# Patient Record
Sex: FEMALE | Race: WHITE | Employment: FULL TIME | URBAN - METROPOLITAN AREA
[De-identification: names, ages, dates, MRNs, and addresses within clinical notes are randomized per-mention and may not be internally consistent; named-entity substitution may affect disease eponyms.]

---

## 2017-10-25 ENCOUNTER — HOSPITAL ENCOUNTER (OUTPATIENT)
Dept: LAB | Age: 47
Discharge: HOME | End: 2017-10-25
Payer: COMMERCIAL

## 2017-10-25 LAB
ALBUMIN SERPL-MCNC: 3.9 G/DL (ref 3.4–5)
ALBUMIN/GLOB SERPL: 1.2 {RATIO} (ref 0.8–1.7)
ALP SERPL-CCNC: 46 U/L (ref 45–117)
ALT SERPL-CCNC: 25 U/L (ref 13–56)
ANION GAP SERPL CALC-SCNC: 7 MMOL/L (ref 3–18)
AST SERPL-CCNC: 18 U/L (ref 15–37)
BASOPHILS # BLD: 0 K/UL (ref 0–0.06)
BASOPHILS NFR BLD: 1 % (ref 0–2)
BILIRUB SERPL-MCNC: 0.5 MG/DL (ref 0.2–1)
BUN SERPL-MCNC: 11 MG/DL (ref 7–18)
BUN/CREAT SERPL: 13 (ref 12–20)
CALCIUM SERPL-MCNC: 8.8 MG/DL (ref 8.5–10.1)
CHLORIDE SERPL-SCNC: 103 MMOL/L (ref 100–108)
CHOLEST SERPL-MCNC: 182 MG/DL
CO2 SERPL-SCNC: 28 MMOL/L (ref 21–32)
CREAT SERPL-MCNC: 0.85 MG/DL (ref 0.6–1.3)
DIFFERENTIAL METHOD BLD: ABNORMAL
EOSINOPHIL # BLD: 0.2 K/UL (ref 0–0.4)
EOSINOPHIL NFR BLD: 4 % (ref 0–5)
ERYTHROCYTE [DISTWIDTH] IN BLOOD BY AUTOMATED COUNT: 12.3 % (ref 11.6–14.5)
GLOBULIN SER CALC-MCNC: 3.2 G/DL (ref 2–4)
GLUCOSE SERPL-MCNC: 92 MG/DL (ref 74–99)
HCT VFR BLD AUTO: 40.9 % (ref 35–45)
HDLC SERPL-MCNC: 84 MG/DL (ref 40–60)
HDLC SERPL: 2.2 {RATIO} (ref 0–5)
HGB BLD-MCNC: 13.9 G/DL (ref 12–16)
LDLC SERPL CALC-MCNC: 80.6 MG/DL (ref 0–100)
LIPID PROFILE,FLP: ABNORMAL
LYMPHOCYTES # BLD: 1.7 K/UL (ref 0.9–3.6)
LYMPHOCYTES NFR BLD: 36 % (ref 21–52)
MCH RBC QN AUTO: 34.2 PG (ref 24–34)
MCHC RBC AUTO-ENTMCNC: 34 G/DL (ref 31–37)
MCV RBC AUTO: 100.5 FL (ref 74–97)
MONOCYTES # BLD: 0.6 K/UL (ref 0.05–1.2)
MONOCYTES NFR BLD: 13 % (ref 3–10)
NEUTS SEG # BLD: 2.1 K/UL (ref 1.8–8)
NEUTS SEG NFR BLD: 46 % (ref 40–73)
PLATELET # BLD AUTO: 332 K/UL (ref 135–420)
PMV BLD AUTO: 10.3 FL (ref 9.2–11.8)
POTASSIUM SERPL-SCNC: 4 MMOL/L (ref 3.5–5.5)
PROT SERPL-MCNC: 7.1 G/DL (ref 6.4–8.2)
RBC # BLD AUTO: 4.07 M/UL (ref 4.2–5.3)
SODIUM SERPL-SCNC: 138 MMOL/L (ref 136–145)
TRIGL SERPL-MCNC: 87 MG/DL (ref ?–150)
TSH SERPL DL<=0.05 MIU/L-ACNC: 3.82 UIU/ML (ref 0.36–3.74)
VLDLC SERPL CALC-MCNC: 17.4 MG/DL
WBC # BLD AUTO: 4.6 K/UL (ref 4.6–13.2)

## 2017-10-25 PROCEDURE — 36415 COLL VENOUS BLD VENIPUNCTURE: CPT | Performed by: FAMILY MEDICINE

## 2017-10-25 PROCEDURE — 80061 LIPID PANEL: CPT | Performed by: FAMILY MEDICINE

## 2017-10-25 PROCEDURE — 85025 COMPLETE CBC W/AUTO DIFF WBC: CPT | Performed by: FAMILY MEDICINE

## 2017-10-25 PROCEDURE — 84443 ASSAY THYROID STIM HORMONE: CPT | Performed by: FAMILY MEDICINE

## 2017-10-25 PROCEDURE — 80053 COMPREHEN METABOLIC PANEL: CPT | Performed by: FAMILY MEDICINE

## 2017-11-03 ENCOUNTER — HOSPITAL ENCOUNTER (OUTPATIENT)
Dept: MAMMOGRAPHY | Age: 47
Discharge: HOME | End: 2017-11-03
Attending: FAMILY MEDICINE
Payer: COMMERCIAL

## 2017-11-03 DIAGNOSIS — Z12.31 VISIT FOR SCREENING MAMMOGRAM: ICD-10-CM

## 2017-11-03 PROCEDURE — 77067 SCR MAMMO BI INCL CAD: CPT

## 2017-11-14 ENCOUNTER — DOCUMENTATION ONLY (OUTPATIENT)
Dept: SURGERY | Age: 47
End: 2017-11-14

## 2017-11-22 ENCOUNTER — HOSPITAL ENCOUNTER (OUTPATIENT)
Dept: ULTRASOUND IMAGING | Age: 47
Discharge: HOME | End: 2017-11-22
Payer: COMMERCIAL

## 2017-11-22 ENCOUNTER — HOSPITAL ENCOUNTER (OUTPATIENT)
Dept: MAMMOGRAPHY | Age: 47
Discharge: HOME | End: 2017-11-22
Payer: COMMERCIAL

## 2017-11-22 DIAGNOSIS — N64.89 DISTORTION OF CONTOUR OF BREAST: ICD-10-CM

## 2017-11-22 DIAGNOSIS — R92.8 ABNORMAL MAMMOGRAM: ICD-10-CM

## 2017-11-22 PROCEDURE — 77065 DX MAMMO INCL CAD UNI: CPT

## 2018-11-16 ENCOUNTER — HOSPITAL ENCOUNTER (OUTPATIENT)
Dept: MAMMOGRAPHY | Age: 48
Discharge: HOME | End: 2018-11-16
Attending: FAMILY MEDICINE
Payer: COMMERCIAL

## 2018-11-16 DIAGNOSIS — Z12.31 SCREENING MAMMOGRAM, ENCOUNTER FOR: ICD-10-CM

## 2018-11-16 PROCEDURE — 77063 BREAST TOMOSYNTHESIS BI: CPT

## 2019-01-04 ENCOUNTER — HOSPITAL ENCOUNTER (OUTPATIENT)
Dept: ULTRASOUND IMAGING | Age: 49
Discharge: HOME | End: 2019-01-04
Attending: FAMILY MEDICINE
Payer: COMMERCIAL

## 2019-01-04 ENCOUNTER — HOSPITAL ENCOUNTER (OUTPATIENT)
Dept: MAMMOGRAPHY | Age: 49
Discharge: HOME | End: 2019-01-04
Attending: FAMILY MEDICINE
Payer: COMMERCIAL

## 2019-01-04 DIAGNOSIS — R92.8 ABNORMAL MAMMOGRAM: ICD-10-CM

## 2019-01-04 PROCEDURE — 77062 BREAST TOMOSYNTHESIS BI: CPT

## 2019-01-04 PROCEDURE — 76642 ULTRASOUND BREAST LIMITED: CPT

## 2019-01-24 ENCOUNTER — HOSPITAL ENCOUNTER (OUTPATIENT)
Dept: MAMMOGRAPHY | Age: 49
Discharge: HOME | End: 2019-01-24
Attending: NURSE PRACTITIONER
Payer: COMMERCIAL

## 2019-01-24 DIAGNOSIS — R92.0 ABNORMAL MAMMOGRAM WITH MICROCALCIFICATION: ICD-10-CM

## 2019-01-24 DIAGNOSIS — R92.8 ABNORMAL MAMMOGRAM: ICD-10-CM

## 2019-01-24 PROCEDURE — 74011250636 HC RX REV CODE- 250/636

## 2019-01-24 PROCEDURE — 74011000250 HC RX REV CODE- 250

## 2019-01-24 PROCEDURE — 77061 BREAST TOMOSYNTHESIS UNI: CPT

## 2019-01-24 PROCEDURE — 77065 DX MAMMO INCL CAD UNI: CPT

## 2019-01-24 PROCEDURE — 88305 TISSUE EXAM BY PATHOLOGIST: CPT

## 2019-01-24 PROCEDURE — 19081 BX BREAST 1ST LESION STRTCTC: CPT

## 2019-01-24 RX ORDER — LIDOCAINE HYDROCHLORIDE AND EPINEPHRINE 10; 10 MG/ML; UG/ML
1-20 INJECTION, SOLUTION INFILTRATION; PERINEURAL
Status: COMPLETED | OUTPATIENT
Start: 2019-01-24 | End: 2019-01-24

## 2019-01-24 RX ORDER — LIDOCAINE HYDROCHLORIDE 10 MG/ML
1-5 INJECTION, SOLUTION EPIDURAL; INFILTRATION; INTRACAUDAL; PERINEURAL
Status: COMPLETED | OUTPATIENT
Start: 2019-01-24 | End: 2019-01-24

## 2019-01-24 RX ADMIN — LIDOCAINE HYDROCHLORIDE AND EPINEPHRINE 20 MG: 10; 10 INJECTION, SOLUTION INFILTRATION; PERINEURAL at 10:00

## 2019-01-24 RX ADMIN — LIDOCAINE HYDROCHLORIDE 3 ML: 10 INJECTION, SOLUTION EPIDURAL; INFILTRATION; INTRACAUDAL; PERINEURAL at 09:10

## 2019-01-24 RX ADMIN — LIDOCAINE HYDROCHLORIDE AND EPINEPHRINE 20 MG: 10; 10 INJECTION, SOLUTION INFILTRATION; PERINEURAL at 09:10

## 2019-01-24 RX ADMIN — LIDOCAINE HYDROCHLORIDE 3 ML: 10 INJECTION, SOLUTION EPIDURAL; INFILTRATION; INTRACAUDAL; PERINEURAL at 10:00

## 2019-02-22 ENCOUNTER — OFFICE VISIT (OUTPATIENT)
Dept: SURGERY | Age: 49
End: 2019-02-22

## 2019-02-22 VITALS
DIASTOLIC BLOOD PRESSURE: 86 MMHG | RESPIRATION RATE: 16 BRPM | SYSTOLIC BLOOD PRESSURE: 134 MMHG | HEIGHT: 68 IN | WEIGHT: 140 LBS | TEMPERATURE: 98 F | HEART RATE: 65 BPM | BODY MASS INDEX: 21.22 KG/M2

## 2019-02-22 DIAGNOSIS — R92.8 ABNORMAL MAMMOGRAM: Primary | ICD-10-CM

## 2019-02-22 NOTE — PROGRESS NOTES
Progress Note    Patient: Romana Nasicmento  MRN: [id number]  SSN: [id number]   YOB: 1970  Age: 50 y.o. Sex: female     Chief Complaint   Patient presents with    Advice Only     intraductal papiloma       HPI    Ms. Leanne Ellison he is a 55-year-old woman who presents with a history of breast biopsies x2 by radiology that turned out to be intraductal papillomas in the periphery of her breast.  He is never had nipple discharge. She has no increased risk of breast cancer and no family members with breast, ovarian, or other cancers. She has 3 kids of menarche at 15. I spent over 45 minutes with the family showed them images from the books regarding the look of intraductal papilloma. I have personally reviewed the images of her ultrasound and mammogram.    History reviewed. No pertinent past medical history.   Past Surgical History:   Procedure Laterality Date    HX HEENT      tooth extraction     HX OTHER SURGICAL      strawberry birthmark removed x2 as infant    HX OTHER SURGICAL  1991    lymph node under right arm cleaned out due to cat scratch fever    HX UROLOGICAL  1998    kidney stone blasted    VASCULAR SURGERY PROCEDURE UNLIST  2009    venous closure dominik extremities      Allergies   Allergen Reactions    Percocet [Oxycodone-Acetaminophen] Nausea and Vomiting    Stadol [Butorphanol Tartrate] Palpitations    Tape [Adhesive] Rash     Surgical tape        Social History     Socioeconomic History    Marital status:      Spouse name: Not on file    Number of children: Not on file    Years of education: Not on file    Highest education level: Not on file   Social Needs    Financial resource strain: Not on file    Food insecurity - worry: Not on file    Food insecurity - inability: Not on file   [employer] needs - medical: Not on file   [employer] needs - non-medical: Not on file   Occupational History    Not on file   Tobacco Use    Smoking status: Never Smoker    Smokeless tobacco: Never Used   Substance and Sexual Activity    Alcohol use: Yes     Comment: socially    Drug use: Not on file    Sexual activity: Not on file   Other Topics Concern    Not on file   Social History Narrative    Not on file     Family History   Problem Relation Age of Onset    Cancer Maternal Grandmother          Review of systems:  Patient denies any reflux, emesis, abdominal pain, change in bowel habits, hematochezia, melena, fever, weight loss, fatigue chills, dermatitis, abnormal moles, change in vision, vertigo, epistaxis, dysphagia, hoarseness, chest pain, palpitations, hypertension, edema, cough, shortness of breath, wheezing, hemoptysis, snoring, hematuria, diabetes, thyroid disease, anemia, bruising, history of blood transfusion, dizziness, headache, or fainting. Physical Examination    Well developed well nourished female in no apparent distress  Visit Vitals  /86   Pulse 65   Temp 98 °F (36.7 °C)   Resp 16   Ht 5' 8\" (1.727 m)   Wt 63.5 kg (140 lb)   BMI 21.29 kg/m²      Head: normocephalic, atraumatic  Mouth: Clear, no overt lesions, oral mucosa pink and moist  Neck: supple, no masses, no adenopathy or carotid bruits, trachea midline  Resp: clear to auscultation bilaterally, no wheeze, rhonchi or rales, excursions normal and symmetrical  Cardio: Regular rate and rhythm, no murmurs, clicks, gallops or rubs, no edema or varicosities  Abdomen: soft, nontender, nondistended, normoactive bowel sounds, no hernias, no hepatosplenomegaly,   Back: Deferred  Extremeties: warm, well-perfused, no tenderness or swelling, normal gait/station  Neuro: sensation and strength grossly intact and symmetrical  Psych: alert and oriented to person, place and time  Breast exam bilateral breast biopsy shows 2 healing biopsy sites in the right breast and a small hematoma behind them. No other masses, skin change, nipple discharge or lymphadenopathy felt.   Left breast normal exam    IMPRESSION  Intraductal papilloma of the peripheral type without atypia    PLAN  No orders of the defined types were placed in this encounter.     Follow-up 6 months with mammogram  Jose Miller MD

## 2019-08-15 ENCOUNTER — HOSPITAL ENCOUNTER (OUTPATIENT)
Dept: MAMMOGRAPHY | Age: 49
Discharge: HOME | End: 2019-08-15
Payer: COMMERCIAL

## 2019-08-15 DIAGNOSIS — R92.8 ABNORMAL MAMMOGRAM: ICD-10-CM

## 2019-08-15 PROCEDURE — 77066 DX MAMMO INCL CAD BI: CPT

## 2019-08-30 ENCOUNTER — OFFICE VISIT (OUTPATIENT)
Dept: SURGERY | Age: 49
End: 2019-08-30

## 2019-08-30 VITALS — HEIGHT: 68 IN | WEIGHT: 140 LBS | BODY MASS INDEX: 21.22 KG/M2 | RESPIRATION RATE: 16 BRPM

## 2019-08-30 DIAGNOSIS — R92.8 ABNORMAL MAMMOGRAM: Primary | ICD-10-CM

## 2019-08-30 NOTE — PROGRESS NOTES
Progress Note    Patient: Violetta Ventura  MRN: [id number]  SSN: [id number]   YOB: 1970  Age: 52 y.o. Sex: female     Chief Complaint   Patient presents with   Ocie Shoulders 3       HPI    Ms. Ricardo Lindsey is a 51-year-old woman with a history of 2 right breast intraductal papillomas excised and some other papillomas noted on mammogram and. She is back today with a new ultrasound 6 months after her previous and it is again a BI-RADS 3. She has no breast health complaints and no palpable nodules. Is not had any breast discharge    History reviewed. No pertinent past medical history.   Past Surgical History:   Procedure Laterality Date    HX HEENT      tooth extraction     HX OTHER SURGICAL      strawberry birthmark removed x2 as infant    HX OTHER SURGICAL  1991    lymph node under right arm cleaned out due to cat scratch fever    HX UROLOGICAL  1998    kidney stone blasted    VASCULAR SURGERY PROCEDURE UNLIST  2009    venous closure dominik extremities      Allergies   Allergen Reactions    Percocet [Oxycodone-Acetaminophen] Nausea and Vomiting    Stadol [Butorphanol Tartrate] Palpitations    Tape [Adhesive] Rash     Surgical tape        Social History     Socioeconomic History    Marital status:      Spouse name: Not on file    Number of children: Not on file    Years of education: Not on file    Highest education level: Not on file   Occupational History    Not on file   Social Needs    Financial resource strain: Not on file    Food insecurity:     Worry: Not on file     Inability: Not on file    Transportation needs:     Medical: Not on file     Non-medical: Not on file   Tobacco Use    Smoking status: Never Smoker    Smokeless tobacco: Never Used   Substance and Sexual Activity    Alcohol use: Yes     Comment: socially    Drug use: Not on file    Sexual activity: Not on file   Lifestyle    Physical activity:     Days per week: Not on file     Minutes per session: Not on file    Stress: Not on file   Relationships    Social connections:     Talks on phone: Not on file     Gets together: Not on file     Attends Shinto service: Not on file     Active member of club or organization: Not on file     Attends meetings of clubs or organizations: Not on file     Relationship status: Not on file    Intimate partner violence:     Fear of current or ex partner: Not on file     Emotionally abused: Not on file     Physically abused: Not on file     Forced sexual activity: Not on file   Other Topics Concern    Not on file   Social History Narrative    Not on file     Family History   Problem Relation Age of Onset    Cancer Maternal Grandmother          Review of systems:  Patient denies any reflux, emesis, abdominal pain, change in bowel habits, hematochezia, melena, fever, weight loss, fatigue chills, dermatitis, abnormal moles, change in vision, vertigo, epistaxis, dysphagia, hoarseness, chest pain, palpitations, hypertension, edema, cough, shortness of breath, wheezing, hemoptysis, snoring, hematuria, diabetes, thyroid disease, anemia, bruising, history of blood transfusion, dizziness, headache, or fainting.     Physical Examination    Well developed well nourished female in no apparent distress  Visit Vitals  Resp 16   Ht 5' 8\" (1.727 m)   Wt 63.5 kg (140 lb)   BMI 21.29 kg/m²      Head: normocephalic, atraumatic  Mouth: Clear, no overt lesions, oral mucosa pink and moist  Neck: supple, no masses, no adenopathy or carotid bruits, trachea midline  Resp: clear to auscultation bilaterally, no wheeze, rhonchi or rales, excursions normal and symmetrical  Cardio: Regular rate and rhythm, no murmurs, clicks, gallops or rubs, no edema or varicosities  Abdomen: soft, nontender, nondistended, normoactive bowel sounds, no hernias, no hepatosplenomegaly,   Back: Deferred  Extremeties: warm, well-perfused, no tenderness or swelling, normal gait/station  Neuro: sensation and strength grossly intact and symmetrical  Psych: alert and oriented to person, place and time  Breast exam bilateral breast exam without dominant mass, skin change, nipple discharge, lymphadenopathy    IMPRESSION  History of intraductal papilloma now stable mammogram    PLAN  No orders of the defined types were placed in this encounter.     Follow-up 6 months with mammogram  Fidel Arellano MD

## 2020-02-17 ENCOUNTER — HOSPITAL ENCOUNTER (OUTPATIENT)
Dept: MAMMOGRAPHY | Age: 50
Discharge: HOME | End: 2020-02-17
Payer: COMMERCIAL

## 2020-02-17 DIAGNOSIS — R92.8 ABNORMAL MAMMOGRAM: ICD-10-CM

## 2020-02-17 PROCEDURE — 77063 BREAST TOMOSYNTHESIS BI: CPT

## 2020-03-11 ENCOUNTER — OFFICE VISIT (OUTPATIENT)
Dept: SURGERY | Age: 50
End: 2020-03-11

## 2020-03-11 VITALS
BODY MASS INDEX: 21.98 KG/M2 | TEMPERATURE: 98.6 F | RESPIRATION RATE: 20 BRPM | HEART RATE: 70 BPM | WEIGHT: 145 LBS | HEIGHT: 68 IN | DIASTOLIC BLOOD PRESSURE: 64 MMHG | SYSTOLIC BLOOD PRESSURE: 136 MMHG

## 2020-03-11 DIAGNOSIS — D36.9 INTRADUCTAL PAPILLOMA: Primary | ICD-10-CM

## 2020-03-11 NOTE — PROGRESS NOTES
Patient presents to establish care. 1. Have you been to the ER, urgent care clinic since your last visit? Hospitalized since your last visit? No    2. Have you seen or consulted any other health care providers outside of the [address] [place] [address] since your last visit? Include any pap smears or colon screening.  No

## 2020-03-11 NOTE — LETTER
3/11/2020 9:29 AM 
 
Patient:  Syndi Bhatti YOB: 1970 Date of Visit: 3/11/2020 241 Greg Nicole MD 
[address] [place], [place] [address] [place] [address] VIA Facsimile: [phone] Dear 241 Greg Nicole MD, 
 
 
I had the pleasure of seeing Ms. Sydni Bhatti in my office today for her intraductal papilloma in transition from Dr. Karrie Reyes. I am including a copy of my office visit today. If you have questions, please do not hesitate to call me. I look forward to following Ms. Coulter along with you and will keep you updated as to her progress. Sincerely, Tammy Lucio MD

## 2020-03-11 NOTE — PATIENT INSTRUCTIONS
If you have any questions or concerns about today's appointment, the verbal and/or written instructions you were given for follow up care, please call our office at [phone].     [facility] - [address]    [phone] office  [phone]

## 2020-03-11 NOTE — PROGRESS NOTES
Ms. Conchita Colbert is a 52year old woman with right breast intraductal papilloma, s/p core biopsy 1/4/19. After meeting with Dr. Willy Castillo, she elected to proceed with conservative management. She denied breast mass. She denied nipple discharge. There is no family history of breast cancer or ovarian cancer. Breast/GYN history:    OB History    No obstetric history on file. No past medical history on file. Past Surgical History:   Procedure Laterality Date    HX HEENT      tooth extraction     HX OTHER SURGICAL      strawberry birthmark removed x2 as infant    HX OTHER SURGICAL  1991    lymph node under right arm cleaned out due to cat scratch fever    HX UROLOGICAL  1998    kidney stone blasted    VASCULAR SURGERY PROCEDURE UNLIST  2009    venous closure dominik extremities        No current outpatient medications on file prior to visit. No current facility-administered medications on file prior to visit.         Allergies   Allergen Reactions    Percocet [Oxycodone-Acetaminophen] Nausea and Vomiting    Stadol [Butorphanol Tartrate] Palpitations    Tape [Adhesive] Rash     Surgical tape        Social History     Tobacco Use    Smoking status: Never Smoker    Smokeless tobacco: Never Used   Substance Use Topics    Alcohol use: Yes     Comment: socially    Drug use: Not on file       Family History   Problem Relation Age of Onset    Cancer Maternal Grandmother          ROS: positives are bolded  General: fevers, chills, night sweats, fatigue, weight loss, weight gain  GI: nausea, vomiting, abdominal pain, change in bowel habits, hematochezia, melena, GERD  Integ: dermatitis, abnormal moles  HEENT: visual changes, vertigo, epistaxis, dysphagia, hoarseness  Cardiac: chest pain, palpitations, HTN, edema, varicosities  Resp: cough, shortness of breath, wheezing, hemoptysis, snoring, reactive airway disease  : hematuria, dysuria, frequency, urgency, nocturia, stress urinary incontinence   MSK: weakness, joint pain, arthritis  Endocrine:  thyroid disease, polyuria, polydipsia, polyphagia, poor wound healing, heat intolerance, cold intolerance  Lymph/Heme: anemia, bruising, history of blood transfusions  Neuro: dizziness, headache, fainting, seizures, stroke  Psych: anxiety, depression    Physical Exam:  Visit Vitals  /64 (BP 1 Location: Right arm, BP Patient Position: Sitting)   Pulse 70   Temp 98.6 °F (37 °C) (Oral)   Resp 20   Ht 5' 8\" (1.727 m)   Wt 65.8 kg (145 lb)   LMP 2019 (Exact Date)   BMI 22.05 kg/m²       Gen:  No distress  Head: normocephalic, atraumatic  Mouth: Clear, no overt lesions, oral mucosa pink and moist  Neck: supple, no masses, no adenopathy, trachea midline  Resp: clear bilaterally  Cardio: Regular rate and rhythm  Abdomen: soft, nontender, nondistended  Extremeties: warm, well-perfused  Neuro: sensation and strength grossly intact and symmetrical  Psych: alert and oriented to person, place and time  Breasts:   Right: Examined in both the supine and upright positions. There was no supraclavicular, infraclavicular, or axillary lympadenopathy. There were no dominant masses, no skin changes, no asymmetry identified healed core biopsy wound, breast smaller than left  Left: Examined in both the supine and upright positions. There was no supraclavicular, infraclavicular, or axillary lympadenopathy. There were no dominant masses, no skin changes, no asymmetry identified       Imagin20 bilateral mammogram (HV)  1. Likely benign findings due to intraductal papilloma. Short term imaging is recommended including: Diagnostic mammogram in 6 months.      BI-RADS Category 3 - Probably Benign     The lack of mammographic evidence of malignancy should not deter further work-up  of a clinically significant palpable finding. Radiodense breast tissue may  obscure an early malignancy or a palpable finding.  10-15% of breast cancers are  not detected by mammography.       Pathology:  1/24/19   A: RIGHT BREAST BIOPSY, 12:00 POSTERIOR:   ADENOSIS, INTRADUCTAL HYPERPLASIA, AND MICROCALCIFICATIONS. B: RIGHT BREAST BIOPSY, 10:00 ANTERIOR:   INTRADUCTAL PAPILLOMA WITH MICROCALCIFICATIONS; ADENOSIS AND INTRADUCTAL HYPERPLASIA. Impression:  Patient Active Problem List   Diagnosis Code    Intraductal papilloma D399      52year old woman with right breast intraductal papilloma, s/p core biopsy 1/4/19. We again reviewed the pathology results of intraductal papilloma in detail. We discussed that they are not malignant. They may cause mass and/or bloody nipple discharge. Traditional recommendation is for excision, though with larger core biopsies, commonly the intraductal papilloma has already been entirely removed. Ms. Holley Jha has already elected to proceed with conservative management and is content with this decision. The imaging was reviewed. Radiology has recommended right mammogram in 6 months (August 2020). She will be due for bilateral mammogram February 2021. Follow up after imaging. She was asked to call sooner with questions or concerns.

## 2020-11-11 ENCOUNTER — HOSPITAL ENCOUNTER (OUTPATIENT)
Dept: LAB | Age: 50
Discharge: HOME | End: 2020-11-11
Payer: COMMERCIAL

## 2020-11-11 LAB
25(OH)D3 SERPL-MCNC: 36.4 NG/ML (ref 30–100)
ALBUMIN SERPL-MCNC: 4.2 G/DL (ref 3.4–5)
ALBUMIN/GLOB SERPL: 1.2 {RATIO} (ref 0.8–1.7)
ALP SERPL-CCNC: 59 U/L (ref 45–117)
ALT SERPL-CCNC: 23 U/L (ref 13–56)
ANION GAP SERPL CALC-SCNC: 2 MMOL/L (ref 3–18)
AST SERPL-CCNC: 18 U/L (ref 10–38)
BASOPHILS # BLD: 0 K/UL (ref 0–0.1)
BASOPHILS NFR BLD: 1 % (ref 0–2)
BILIRUB SERPL-MCNC: 0.5 MG/DL (ref 0.2–1)
BUN SERPL-MCNC: 11 MG/DL (ref 7–18)
BUN/CREAT SERPL: 14 (ref 12–20)
CALCIUM SERPL-MCNC: 9.6 MG/DL (ref 8.5–10.1)
CHLORIDE SERPL-SCNC: 106 MMOL/L (ref 100–111)
CHOLEST SERPL-MCNC: 198 MG/DL
CO2 SERPL-SCNC: 33 MMOL/L (ref 21–32)
CREAT SERPL-MCNC: 0.76 MG/DL (ref 0.6–1.3)
DIFFERENTIAL METHOD BLD: ABNORMAL
EOSINOPHIL # BLD: 0.1 K/UL (ref 0–0.4)
EOSINOPHIL NFR BLD: 2 % (ref 0–5)
ERYTHROCYTE [DISTWIDTH] IN BLOOD BY AUTOMATED COUNT: 11.7 % (ref 11.6–14.5)
GLOBULIN SER CALC-MCNC: 3.4 G/DL (ref 2–4)
GLUCOSE SERPL-MCNC: 98 MG/DL (ref 74–99)
HCT VFR BLD AUTO: 43.4 % (ref 35–45)
HDLC SERPL-MCNC: 96 MG/DL (ref 40–60)
HDLC SERPL: 2.1 {RATIO} (ref 0–5)
HGB BLD-MCNC: 15 G/DL (ref 12–16)
LDLC SERPL CALC-MCNC: 81.6 MG/DL (ref 0–100)
LIPID PROFILE,FLP: ABNORMAL
LYMPHOCYTES # BLD: 1.7 K/UL (ref 0.9–3.6)
LYMPHOCYTES NFR BLD: 40 % (ref 21–52)
MCH RBC QN AUTO: 35.5 PG (ref 24–34)
MCHC RBC AUTO-ENTMCNC: 34.6 G/DL (ref 31–37)
MCV RBC AUTO: 102.8 FL (ref 74–97)
MONOCYTES # BLD: 0.4 K/UL (ref 0.05–1.2)
MONOCYTES NFR BLD: 9 % (ref 3–10)
NEUTS SEG # BLD: 2.1 K/UL (ref 1.8–8)
NEUTS SEG NFR BLD: 48 % (ref 40–73)
PLATELET # BLD AUTO: 281 K/UL (ref 135–420)
PMV BLD AUTO: 10.5 FL (ref 9.2–11.8)
POTASSIUM SERPL-SCNC: 4.3 MMOL/L (ref 3.5–5.5)
PROT SERPL-MCNC: 7.6 G/DL (ref 6.4–8.2)
RBC # BLD AUTO: 4.22 M/UL (ref 4.2–5.3)
SODIUM SERPL-SCNC: 141 MMOL/L (ref 136–145)
TRIGL SERPL-MCNC: 102 MG/DL (ref ?–150)
VLDLC SERPL CALC-MCNC: 20.4 MG/DL
WBC # BLD AUTO: 4.3 K/UL (ref 4.6–13.2)

## 2020-11-11 PROCEDURE — 85025 COMPLETE CBC W/AUTO DIFF WBC: CPT

## 2020-11-11 PROCEDURE — 80053 COMPREHEN METABOLIC PANEL: CPT

## 2020-11-11 PROCEDURE — 82306 VITAMIN D 25 HYDROXY: CPT

## 2020-11-11 PROCEDURE — 80061 LIPID PANEL: CPT

## 2020-11-11 PROCEDURE — 36415 COLL VENOUS BLD VENIPUNCTURE: CPT

## 2021-02-03 ENCOUNTER — TELEPHONE (OUTPATIENT)
Dept: SURGERY | Age: 51
End: 2021-02-03

## 2021-02-03 DIAGNOSIS — D36.9 INTRADUCTAL PAPILLOMA: ICD-10-CM

## 2021-02-03 DIAGNOSIS — R92.8 ABNORMAL MAMMOGRAM: Primary | ICD-10-CM

## 2021-02-03 DIAGNOSIS — R92.8 ABNORMAL MAMMOGRAM: ICD-10-CM

## 2021-02-25 ENCOUNTER — HOSPITAL ENCOUNTER (OUTPATIENT)
Dept: MAMMOGRAPHY | Age: 51
Discharge: HOME | End: 2021-02-25
Attending: SURGERY
Payer: COMMERCIAL

## 2021-02-25 ENCOUNTER — HOSPITAL ENCOUNTER (OUTPATIENT)
Dept: ULTRASOUND IMAGING | Age: 51
Discharge: HOME | End: 2021-02-25
Attending: SURGERY

## 2021-02-25 DIAGNOSIS — Z09 FOLLOW-UP EXAM: ICD-10-CM

## 2021-02-25 DIAGNOSIS — R92.8 ABNORMAL MAMMOGRAM: ICD-10-CM

## 2021-02-25 PROCEDURE — 77062 BREAST TOMOSYNTHESIS BI: CPT

## 2021-02-25 NOTE — PROGRESS NOTES
Ms. Dolores Clemens is a 48year old woman with right breast intraductal papilloma, s/p core biopsy 1/4/19. After meeting with Dr. Tabatha Robertson, she elected to proceed with conservative management. She denied breast mass. She denied nipple discharge. There is no family history of breast cancer or ovarian cancer. Breast/GYN history:    OB History    No obstetric history on file. No past medical history on file. Past Surgical History:   Procedure Laterality Date    HX HEENT      tooth extraction     HX OTHER SURGICAL      strawberry birthmark removed x2 as infant    HX OTHER SURGICAL  1991    lymph node under right arm cleaned out due to cat scratch fever    HX UROLOGICAL  1998    kidney stone blasted    VASCULAR SURGERY PROCEDURE UNLIST  2009    venous closure dominik extremities        No current outpatient medications on file prior to visit. No current facility-administered medications on file prior to visit.         Allergies   Allergen Reactions    Percocet [Oxycodone-Acetaminophen] Nausea and Vomiting    Stadol [Butorphanol Tartrate] Palpitations    Tape [Adhesive] Rash     Surgical tape        Social History     Tobacco Use    Smoking status: Never Smoker    Smokeless tobacco: Never Used   Substance Use Topics    Alcohol use: Yes     Comment: socially    Drug use: Not on file       Family History   Problem Relation Age of Onset    Cancer Maternal Grandmother          ROS: positives are bolded  General: fevers, chills, night sweats, fatigue, weight loss, weight gain  GI: nausea, vomiting, abdominal pain, change in bowel habits, hematochezia, melena, GERD  Integ: dermatitis, abnormal moles  HEENT: visual changes, vertigo, epistaxis, dysphagia, hoarseness  Cardiac: chest pain, palpitations, HTN, edema, varicosities  Resp: cough, shortness of breath, wheezing, hemoptysis, snoring, reactive airway disease  : hematuria, dysuria, frequency, urgency, nocturia, stress urinary incontinence   MSK: weakness, joint pain, arthritis  Endocrine:  thyroid disease, polyuria, polydipsia, polyphagia, poor wound healing, heat intolerance, cold intolerance  Lymph/Heme: anemia, bruising, history of blood transfusions  Neuro: dizziness, headache, fainting, seizures, stroke  Psych: anxiety, depression    Physical Exam:  Visit Vitals  /88   Pulse 80   Temp 97.9 °F (36.6 °C) (Skin)   Resp 20   Ht 5' 8\" (1.727 m)   Wt 70.7 kg (155 lb 12.8 oz)   BMI 23.69 kg/m²       Gen:  No distress  Head: normocephalic, atraumatic  Mouth: Clear, no overt lesions, oral mucosa pink and moist  Neck: supple, no masses, no adenopathy, trachea midline  Resp: clear bilaterally  Cardio: Regular rate and rhythm  Abdomen: soft, nontender, nondistended  Extremeties: warm, well-perfused  Neuro: sensation and strength grossly intact and symmetrical  Psych: alert and oriented to person, place and time  Breasts:   Right: Examined in both the supine and upright positions. There was no supraclavicular, infraclavicular, or axillary lympadenopathy. There were no dominant masses, no skin changes, no asymmetry identified healed core biopsy wound, breast smaller than left  Left: Examined in both the supine and upright positions. There was no supraclavicular, infraclavicular, or axillary lympadenopathy. There were no dominant masses, no skin changes, no asymmetry identified       Imagin21 bilateral mammogram (HV)  No mammographic evidence of malignancy. Routine follow-up advised.        Breast density: C - The breast parenchymal pattern is heterogeneously dense,  which may diminish the sensitivity of mammography.        BIRADS 2:  Benign     20 bilateral mammogram (HV)  1. Likely benign findings due to intraductal papilloma.  Short term imaging is recommended including: Diagnostic mammogram in 6 months.      BI-RADS Category 3 - Probably Benign     The lack of mammographic evidence of malignancy should not deter further work-up  of a clinically significant palpable finding. Radiodense breast tissue may  obscure an early malignancy or a palpable finding. 10-15% of breast cancers are  not detected by mammography.       Pathology:  1/24/19   A: RIGHT BREAST BIOPSY, 12:00 POSTERIOR:   ADENOSIS, INTRADUCTAL HYPERPLASIA, AND MICROCALCIFICATIONS. B: RIGHT BREAST BIOPSY, 10:00 ANTERIOR:   INTRADUCTAL PAPILLOMA WITH MICROCALCIFICATIONS; ADENOSIS AND INTRADUCTAL HYPERPLASIA. Impression:  Patient Active Problem List   Diagnosis Code    Intraductal papilloma D399      52year old woman with right breast intraductal papilloma, s/p core biopsy 1/4/19. We again reviewed the pathology results of intraductal papilloma in detail. We discussed that they are not malignant. They may cause mass and/or bloody nipple discharge. Traditional recommendation is for excision, though with larger core biopsies, commonly the intraductal papilloma has already been entirely removed. Ms. Abigail Ritter has already elected to proceed with conservative management and is content with this decision. The imaging was reviewed and was without change. She will be due for bilateral mammogram February 2022. I have recommended annual 3D mammogram and clinical breast exam.   She was asked to call with questions or concerns.

## 2021-02-26 ENCOUNTER — TELEPHONE (OUTPATIENT)
Dept: SURGERY | Age: 51
End: 2021-02-26

## 2021-03-03 ENCOUNTER — OFFICE VISIT (OUTPATIENT)
Dept: SURGERY | Age: 51
End: 2021-03-03
Payer: COMMERCIAL

## 2021-03-03 VITALS
RESPIRATION RATE: 20 BRPM | WEIGHT: 155.8 LBS | DIASTOLIC BLOOD PRESSURE: 88 MMHG | HEIGHT: 68 IN | TEMPERATURE: 97.9 F | BODY MASS INDEX: 23.61 KG/M2 | HEART RATE: 80 BPM | SYSTOLIC BLOOD PRESSURE: 124 MMHG

## 2021-03-03 DIAGNOSIS — D36.9 INTRADUCTAL PAPILLOMA: Primary | ICD-10-CM

## 2021-03-03 PROCEDURE — 99203 OFFICE O/P NEW LOW 30 MIN: CPT | Performed by: SURGERY

## 2021-03-03 NOTE — PROGRESS NOTES
Patient presents for follow up  right breast intraductal papilloma, s/p core biopsy 1/4/19. 1. Have you been to the ER, urgent care clinic since your last visit? Hospitalized since your last visit? No    2. Have you seen or consulted any other health care providers outside of the [address] [place] [address] since your last visit? Include any pap smears or colon screening.  No

## 2021-03-03 NOTE — LETTER
3/3/2021 8:28 AM 
 
Patient:  Susan Roblero YOB: 1970 Date of Visit: 3/3/2021 Augusto Hay NP 
[address] [place] [address] [address] [address] Via Fax: [phone] Dear Augusto Hay NP, 
 
 
I had the pleasure of seeing Ms. Dexter Hayes in my office today for breast exam.  I am including a copy of my office visit today. If you have questions, please do not hesitate to call me. I look forward to following Ms. Coulter along with you and will keep you updated as to her progress. Sincerely, Amador Aburto MD

## 2021-12-17 ENCOUNTER — HOSPITAL ENCOUNTER (OUTPATIENT)
Dept: GENERAL RADIOLOGY | Age: 51
Discharge: HOME | End: 2021-12-17
Payer: COMMERCIAL

## 2021-12-17 DIAGNOSIS — M25.561 RIGHT MEDIAL KNEE PAIN: ICD-10-CM

## 2021-12-17 PROCEDURE — 73564 X-RAY EXAM KNEE 4 OR MORE: CPT

## 2022-01-27 ENCOUNTER — TRANSCRIBE ORDER (OUTPATIENT)
Dept: SCHEDULING | Age: 52
End: 2022-01-27

## 2022-01-27 DIAGNOSIS — Z12.31 SCREENING MAMMOGRAM, ENCOUNTER FOR: Primary | ICD-10-CM

## 2022-02-08 ENCOUNTER — OFFICE VISIT (OUTPATIENT)
Dept: ORTHOPEDIC SURGERY | Age: 52
End: 2022-02-08
Payer: COMMERCIAL

## 2022-02-08 VITALS
HEART RATE: 72 BPM | HEIGHT: 68 IN | TEMPERATURE: 97.3 F | OXYGEN SATURATION: 98 % | WEIGHT: 162 LBS | BODY MASS INDEX: 24.55 KG/M2

## 2022-02-08 DIAGNOSIS — M22.2X1 PATELLOFEMORAL PAIN SYNDROME OF RIGHT KNEE: Primary | ICD-10-CM

## 2022-02-08 PROCEDURE — 99203 OFFICE O/P NEW LOW 30 MIN: CPT | Performed by: ORTHOPAEDIC SURGERY

## 2022-02-08 RX ORDER — MELOXICAM 7.5 MG/1
7.5 TABLET ORAL AS NEEDED
Qty: 30 TABLET | Refills: 1 | Status: SHIPPED | OUTPATIENT
Start: 2022-02-08 | End: 2022-06-13

## 2022-02-08 RX ORDER — HYDROCHLOROTHIAZIDE 25 MG/1
25 TABLET ORAL DAILY
COMMUNITY
Start: 2021-12-02

## 2022-02-08 RX ORDER — AMLODIPINE BESYLATE 5 MG/1
5 TABLET ORAL DAILY
COMMUNITY
Start: 2022-01-10

## 2022-02-08 NOTE — PROGRESS NOTES
Eric Knowles  1970   Chief Complaint   Patient presents with    Knee Pain     right knee        HISTORY OF PRESENT ILLNESS  Eric Knowles is a 46 y.o. female who presents today for evaluation of right knee. She rates her pain 4/10 today. Pain has been present since summer 2021. She \"cracked\" her knee one night and felt tightness since that incident. She works as a  and has noticed an increase in pain. She [place] [email or web address] that caused an increase of pain. Most of the pain is medial. She notes pain is not present when she's doing activities but the pain increases after. She has been taking aleeve with some relief. She notes tightness throughout the right leg. Patient describes the pain as aching that is Constant in nature. Symptoms are worse with bending, stretching and straightening and is better with  Rest. Associated symptoms include swelling. Since problem started, it: is unchanged. Pain does not wake patient up at night. Has taken aleeve for the problem. Has tried following treatments: Injections:NO; Brace:NO; Therapy:NO; Cane/Crutch:NO       Allergies   Allergen Reactions    Percocet [Oxycodone-Acetaminophen] Nausea and Vomiting    Stadol [Butorphanol Tartrate] Palpitations    Tape [Adhesive] Rash     Surgical tape         History reviewed. No pertinent past medical history.    Social History     Socioeconomic History    Marital status:      Spouse name: Not on file    Number of children: Not on file    Years of education: Not on file    Highest education level: Not on file   Occupational History    Not on file   Tobacco Use    Smoking status: Never Smoker    Smokeless tobacco: Never Used   Substance and Sexual Activity    Alcohol use: Yes     Comment: socially    Drug use: Not on file    Sexual activity: Not on file   Other Topics Concern    Not on file   Social History Narrative    Not on file     Social Determinants of Health     Financial Resource Strain:     Difficulty of Paying Living Expenses: Not on file   Food Insecurity:     Worried About Running Out of Food in the Last Year: Not on file    Mariano of Food in the Last Year: Not on file   Transportation Needs:     Lack of Transportation (Medical): Not on file    Lack of Transportation (Non-Medical): Not on file   Physical Activity:     Days of Exercise per Week: Not on file    Minutes of Exercise per Session: Not on file   Stress:     Feeling of Stress : Not on file   Social Connections:     Frequency of Communication with Friends and Family: Not on file    Frequency of Social Gatherings with Friends and Family: Not on file    Attends [employer] Services: Not on file    Active Member of [address] [place] [address] or Organizations: Not on file    Attends Club or Organization Meetings: Not on file    Marital Status: Not on file   Intimate Partner Violence:     Fear of Current or Ex-Partner: Not on file    Emotionally Abused: Not on file    Physically Abused: Not on file    Sexually Abused: Not on file   Housing Stability:     Unable to Pay for Housing in the Last Year: Not on file    Number of [place] in the Last Year: Not on file    Unstable Housing in the Last Year: Not on file      Past Surgical History:   Procedure Laterality Date    HX HEENT      tooth extraction     HX OTHER SURGICAL      strawberry birthmark removed x2 as infant    HX OTHER SURGICAL  1991    lymph node under right arm cleaned out due to cat scratch fever    HX UROLOGICAL  1998    kidney stone blasted    VASCULAR SURGERY PROCEDURE UNLIST  2009    venous closure dominik extremities       Family History   Problem Relation Age of Onset    Cancer Maternal Grandmother       Current Outpatient Medications   Medication Sig    amLODIPine (NORVASC) 5 mg tablet Take 5 mg by mouth daily.  hydroCHLOROthiazide (HYDRODIURIL) 25 mg tablet Take 25 mg by mouth daily. No current facility-administered medications for this visit. REVIEW OF SYSTEM   Patient denies: Weight loss, Fever/Chills, HA, Visual changes, Fatigue, Chest pain, SOB, Abdominal pain, N/V/D/C, Blood in stool or urine, Edema. Pertinent positive as above in HPI. All others were negative    PHYSICAL EXAM:   Visit Vitals  Pulse 72   Temp 97.3 °F (36.3 °C) (Temporal)   Ht 5' 8\" (1.727 m)   Wt 162 lb (73.5 kg)   SpO2 98%   BMI 24.63 kg/m²     The patient is a well-developed, well-nourished female   in no acute distress. The patient is alert and oriented times three. The patient is alert and oriented times three. Mood and affect are normal.  LYMPHATIC: lymph nodes are not enlarged and are within normal limits  SKIN: normal in color and non tender to palpation. There are no bruises or abrasions noted. NEUROLOGICAL: Motor sensory exam is within normal limits. Reflexes are equal bilaterally. There is normal sensation to pinprick and light touch  MUSCULOSKELETAL:  Examination Right knee   Skin Intact   Range of motion 0-130   Effusion -   Medial joint line tenderness -   Lateral joint line tenderness -   Tenderness Pes Bursa -   Tenderness insertion MCL -   Tenderness insertion LCL -   Bakaris -   Patella crepitus +   Patella grind +   Lachman -   Pivot shift -   Anterior drawer -   Posterior drawer -   Varus stress -   Valgus stress -   Neurovascular Intact   Calf Swelling and Tenderness to Palpation -   Osmani's Test -   Hamstring Cord Tightness +        PROCEDURE: none    IMAGING: XR of the right knee with 2 views obtained in the office dated 12/17/2021 was reviewed and read by Dr. Ruben Jurado:   IMPRESSION  No acute findings. Mild/moderate degenerative changes (osteoarthritis) in the medial compartment and patellofemoral joint     IMPRESSION:      ICD-10-CM ICD-9-CM    1. Patellofemoral pain syndrome of right knee  M22.2X1 719.46         PLAN:  1. Patient presents today with right knee pain due to patellofemoral joint syndrome.  I advised her to use pain as a guide and modify activities. Start with PT to focus on hamstring and IT band. Prescribed Mobic 7.5 to help with inflammation as needed. Return if pain increases. Risk factors include: n/a  2. No ultrasound exam indicated today  3. No cortisone injection indicated today   4. Yes Physical/Occupational Therapy indicated today  5. No diagnostic test indicated today:   6. No durable medical equipment indicated today  7. No referral indicated today   8. Yes medications indicated today: MOBIC 7.5  9. No Narcotic indicated today       RTC prn      Scribed by Gauri Juarez) as dictated by Félix Cuellar MD    I, Dr. Félix Cuellar, confirm that all documentation is accurate.     Félix Cuellar M.D.   [facility]

## 2022-02-25 ENCOUNTER — HOSPITAL ENCOUNTER (OUTPATIENT)
Dept: PHYSICAL THERAPY | Age: 52
Discharge: HOME | End: 2022-02-25
Payer: COMMERCIAL

## 2022-02-25 PROCEDURE — 97161 PT EVAL LOW COMPLEX 20 MIN: CPT

## 2022-02-25 PROCEDURE — 97140 MANUAL THERAPY 1/> REGIONS: CPT

## 2022-02-25 PROCEDURE — 97110 THERAPEUTIC EXERCISES: CPT

## 2022-02-25 NOTE — PROGRESS NOTES
PT DAILY TREATMENT NOTE - [facility]     Patient Name: Eric Knowles  Date:2022  : 1970  [x]  Patient  Verified  Payor: BLUE CROSS / Plan: [facility] PPO / Product Type: PPO /    In time:220  Out time:300  Total Treatment Time (min): 40  Visit #: 1 of 10-12        Medicare/BCBS Only   Total Timed Codes (min):  25 1:1 Treatment Time:  40           Treatment Area: Pain in right knee [M25.561]    SUBJECTIVE  Pain Level (0-10 scale): 3/10  Any medication changes, allergies to medications, adverse drug reactions, diagnosis change, or new procedure performed?: [x] No    [] Yes (see summary sheet for update)  Subjective functional status/changes:   [] No changes reported  See eval    OBJECTIVE    15 min []Eval                  []Re-Eval       15 min Therapeutic Exercise:  [] See flow sheet :   Rationale: increase ROM, increase strength and improve coordination to improve the patients ability to ease wwith adl;s      10 min Manual Therapy:  KT to facilitate Pes   Rationale: decrease pain, increase ROM, increase tissue extensibility and decrease trigger points to ease with adl's    With   [] TE   [] TA   [] neuro   [] other: Patient Education: [x] Review HEP    [] Progressed/Changed HEP based on:   [] positioning   [] body mechanics   [] transfers   [] heat/ice application    [] other:      Other Objective/Functional Measures: see eval     Pain Level (0-10 scale) post treatment: 310    ASSESSMENT/Changes in Function: see poc    Patient will continue to benefit from skilled PT services to modify and progress therapeutic interventions, address functional mobility deficits, address ROM deficits, address strength deficits, analyze and address soft tissue restrictions, analyze and cue movement patterns, analyze and modify body mechanics/ergonomics, assess and modify postural abnormalities and address imbalance/dizziness to attain remaining goals.      []  See Plan of Care  []  See progress note/recertification  []  See Discharge Summary         Progress towards goals / Updated goals:  See poc    PLAN  [x]  Upgrade activities as tolerated     [x]  Continue plan of care  []  Update interventions per flow sheet       []  Discharge due to:_  []  Other:_      Ethyl Castleman, PT 2/25/2022  3:58 PM    Future Appointments   Date Time Provider Yoana Gottlieb   3/4/2022  3:00 PM [facility] EUGEN[facility] RM 3 3D [facility]   3/7/2022  8:15 AM Main Peacock, PTA [facility]   3/9/2022  8:15 AM Main Peacock, PTA [facility]   3/14/2022  9:00 AM Main Peacock, PTA [facility]   3/17/2022  2:15 PM Radha Villarreal, PT [facility]   3/21/2022  8:15 AM Main Peacock, PTA [facility]   3/23/2022 11:15 AM Radha Villarreal, PT [facility]   3/28/2022  9:45 AM Main Peacock, PTA [facility]   3/30/2022  9:00 AM Main Peacock, PTA [facility]

## 2022-02-25 NOTE — PROGRESS NOTES
[facility] [employer] [facility] [employer]  [address] [place] [address]  [phone] fax    Plan of Care/ Statement of Necessity for Physical Therapy Services    Patient name: Clair Silver Start of Care: 2022   Referral source: Osbaldo Osorio,* : 1970    Medical Diagnosis: Pain in right knee [M25.561]  Payor: [facility] / Plan: [facility] PPO / Product Type: PPO /  Onset Date:6 months    Treatment Diagnosis: Right Knee Pain   Prior Hospitalization: see medical history Provider#: [id number]   Medications: Verified on Patient summary List    Comorbidities: HTN   Prior Level of Function: . The Plan of Care and following information is based on the information from the initial evaluation. 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 Her knee would Pop and then become tight. Onset occurred after running a 5k last Thanksgiving 2021. She has since changed shoes for teaching classes and does not run as much. Pt denies any pain at the time of activity, but would have an \"ache\" and sometimes mild swelling after performing Aerobic Instruction. ROM= Full flexion/extension =no pain during or at end range. MMT Quad=5-/5, HS=5-/5, Hip Abd=4+/5, Hip IR/ER=4+/5  Minimal tenderness to right IT band or quad tendon. Varus/Valgus at 0 deg/30 deg=Negative  CARISSA Test positive on right. Pain is described as Achy along medial jt line/ MCL and mild swelling at Pes anserine. Pt will benefit from skilled PT to improve functional mobility, activity tolerance, strengthen VMO/hips/glutes/kness to improve QOL and return to PLOF.      Evaluation Complexity History LOW Complexity : Zero comorbidities / personal factors that will impact the outcome / POC; Examination LOW Complexity : 1-2 Standardized tests and measures addressing body structure, function, activity limitation and / or participation in recreation  ;Presentation LOW Complexity : Stable, uncomplicated  ;Clinical Decision Making MEDIUM Complexity : FOTO score of 26-74  Overall Complexity Rating: LOW   Problem List: pain affecting function, decrease ROM, decrease strength, edema affecting function, impaired gait/ balance, decrease ADL/ functional abilitiies, decrease activity tolerance, decrease flexibility/ joint mobility and decrease transfer abilities   Treatment Plan may include any combination of the following: Therapeutic exercise, Therapeutic activities, Neuromuscular re-education, Physical agent/modality, Gait/balance training, Manual therapy, Patient education, Self Care training and Functional mobility training  Patient / Family readiness to learn indicated by: asking questions, trying to perform skills and interest  Persons(s) to be included in education: patient (P)  Barriers to Learning/Limitations: None  Patient Goal (s): Strength in leg and stretching to prevent further injury.   Patient Self Reported Health Status: excellent  Rehabilitation Potential: good    Short Term Goals: To be accomplished in 1 weeks:   1. Pt will be compliant with a verbal HEP to improve knee sx. (KT to facilitate Pes and Pes Isometrics/SLR with ER)    Long Term Goals: To be accomplished in 6 weeks:   1. Pt will increase FOTO score by 11  pts to improve knee sx. 2. Pt will decrease right knee pain to <2/10 to ease with functional activities. 3. Pt will report >70% improvement in sx to ease with performing activities as a . 4. Pt will increase hip extension and abd to 5/5 to ease with Activity Tolerance. Frequency / Duration: Patient to be seen 2 times per week for 6 weeks.     Patient/ CarPatient/ Caregiver education and instruction: Diagnosis, prognosis, self care, activity modification, brace/ splint application and exercises   [x] Plan of care has been reviewed with DANIELLE Chamberlain, PT 2/25/2022 4:06 PM    ________________________________________________________________________    I certify that the above Therapy Services are being furnished while the patient is under my care. I agree with the treatment plan and certify that this therapy is necessary.     [de-identified] Signature:____________Date:_________TIME:________     Danfreddy English,*  ** Signature, Date and Time must be completed for valid certification **    Please sign and return to [facility] [employer] [employer]  [address] [place] [address]  [phone] fax

## 2022-03-04 ENCOUNTER — HOSPITAL ENCOUNTER (OUTPATIENT)
Dept: MAMMOGRAPHY | Age: 52
Discharge: HOME | End: 2022-03-04
Attending: NURSE PRACTITIONER
Payer: COMMERCIAL

## 2022-03-04 DIAGNOSIS — Z12.31 SCREENING MAMMOGRAM, ENCOUNTER FOR: ICD-10-CM

## 2022-03-04 PROCEDURE — 77063 BREAST TOMOSYNTHESIS BI: CPT

## 2022-03-07 ENCOUNTER — HOSPITAL ENCOUNTER (OUTPATIENT)
Dept: PHYSICAL THERAPY | Age: 52
Discharge: HOME | End: 2022-03-07
Payer: COMMERCIAL

## 2022-03-07 PROCEDURE — 97140 MANUAL THERAPY 1/> REGIONS: CPT

## 2022-03-07 PROCEDURE — 97110 THERAPEUTIC EXERCISES: CPT

## 2022-03-07 NOTE — PROGRESS NOTES
PT DAILY TREATMENT NOTE     Patient Name: Jackie Pi  Date:3/7/2022  : 1970  [x]  Patient  Verified  Payor: BLUE CROSS / Plan: [facility] PPO / Product Type: PPO /    In time: 8:18  Out time:9:06  Total Treatment Time (min): 48  Visit #: 2 of 12    Medicare/BCBS Only   Total Timed Codes (min):  48 1:1 Treatment Time:  48       Treatment Area: Pain in right knee [M25.561]    SUBJECTIVE  Pain Level (0-10 scale): 4-5/10 right SI; 0/10 right knee  Any medication changes, allergies to medications, adverse drug reactions, diagnosis change, or new procedure performed?: [x] No    [] Yes (see summary sheet for update)  Subjective functional status/changes:   [] No changes reported  Pt reports no current knee pain but had an episode of increased right SI pain over the weekend that was worse yesterday and is more tender to touch today. She states she wasn't able to stand after prolonged sitting without increased pain yesterday. Today She can bend and walk and just notices soreness when she pushes it. She said the tape came off pretty quickly so she is unsure if it made a difference. She would like pictures of a few exercises to work on to help remember. 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 Potential 3 mm bone island lateral femoral  condyle. Tiny osteophytes in the patellofemoral joint. Quadriceps enthesopathy. No fracture or effusion. Educated on standing even and not crossing legs in sitting  Right SI joint TTP; educated on ice and piriformis stretching  Added piriformis, hip IR/ER in clams 1,2 strengthening for home  Tightness noted in hamstrings and hip flexors on the right  Initiated all exercises per POC    Pain Level (0-10 scale) post treatment: 2-3/10 right SI    ASSESSMENT/Changes in Function: Initiated exercise program per POC. Pt with pelvic obliquity likely contributing to ongoing right SI and right knee pain. She stands in increased right stance and occasionally crosses her legs with sitting. She demonstrates hip flexor, hamstring, piriformis and TFL tightness with general hip weakness. 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 Patient will continue to benefit from skilled PT services to modify and progress therapeutic interventions, address functional mobility deficits, address ROM deficits, address strength deficits, analyze and address soft tissue restrictions, analyze and cue movement patterns, analyze and modify body mechanics/ergonomics, assess and modify postural abnormalities, address imbalance/dizziness and instruct in home and community integration to attain remaining goals. [x]  See Plan of Care  []  See progress note/recertification  []  See Discharge Summary         Progress towards goals / Updated goals:  Short Term Goals:  To be accomplished in 1 weeks:  1. Pt will be compliant with a verbal HEP to improve knee sx. (KT to facilitate Pes and Pes Isometrics/SLR with ER)  Long Term Goals: To be accomplished in 6 weeks:  1. Pt will increase FOTO score by 11  pts to improve knee sx. 2. Pt will decrease right knee pain to <2/10 to ease with functional activities. 3. Pt will report >70% improvement in sx to ease with performing activities as a . 4. Pt will increase hip extension and abd to 5/5 to ease with Activity Tolerance.     PLAN  [x]  Upgrade activities as tolerated     [x]  Continue plan of care  []  Update interventions per flow sheet       []  Discharge due to:_  []  Other:_      Viral Ramsey, DANIELLE 3/7/2022  7:36 AM    Future Appointments   Date Time Provider Yoana Gottlieb   3/7/2022  8:15 AM Lisa Lent, PTA [facility]   3/9/2022  8:15 AM Lisa Lent, PTA [facility]   3/14/2022  9:00 AM Lisa Lent, PTA [facility]   3/17/2022  2:15 PM Dom Wilcox, PT [facility]   3/21/2022  8:15 AM Lisa Lent, PTA [facility]   3/23/2022 11:15 AM Dom Wilcox, PT [facility]   3/28/2022  9:45 AM Lisa Lent, PTA [facility]   3/30/2022  9:00 AM Lisa Lent, PTA [facility]

## 2022-03-09 ENCOUNTER — HOSPITAL ENCOUNTER (OUTPATIENT)
Dept: PHYSICAL THERAPY | Age: 52
Discharge: HOME | End: 2022-03-09
Payer: COMMERCIAL

## 2022-03-09 PROCEDURE — 97110 THERAPEUTIC EXERCISES: CPT

## 2022-03-09 NOTE — PROGRESS NOTES
PT DAILY TREATMENT NOTE     Patient Name: Fredis Alamo  Date:3/9/2022  : 1970  [x]  Patient  Verified  Payor: BLUE CROSS / Plan: [facility] PPO / Product Type: PPO /    In time: 8:15  Out time: 9:00  Total Treatment Time (min): 45  Visit #: 3 of 12    Medicare/BCBS Only   Total Timed Codes (min):  45 1:1 Treatment Time:  45       Treatment Area: Pain in right knee [M25.561]    SUBJECTIVE  Pain Level (0-10 scale): 0/10  Any medication changes, allergies to medications, adverse drug reactions, diagnosis change, or new procedure performed?: [x] No    [] Yes (see summary sheet for update)  Subjective functional status/changes:   [] No changes reported  Pt report no knee pain but was sore yesterday but she also did a class in the morning. She states still some tenderness (like a bruise) back on her right low back. She incorporated the piriformis stretch and clams at home for strengthening. 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 Potential 3 mm bone island lateral femoral  condyle. Tiny osteophytes in the patellofemoral joint. Quadriceps enthesopathy. No fracture or effusion. Initiated vickie stretch and psoas stretching for hip flexor tightness right>left  (+) vickie bilaterally for psoas not rectus  Initiated LAQ x 3 and hip thrusts for stability  Challenged with hip ER on the left compared to right with TB  Left side plank full: 1:21  Right side plank full: 1:15  B HS tightness noted with SLR    Pain Level (0-10 scale) post treatment: 0/10    ASSESSMENT/Changes in Function: Pt progressing with reduction of upslip with more awareness of sitting posture without crossed legs and more even weight shift in standing. She demonstrates right>left psoas tightness with (+) vickie test bilaterally. She has decreased B HS flexibility noted with increased flexion during SLR. She has decreased hip ER strength and hip abduction strength. She has good side plank endurance. Will continue to progress core and hip stability while addressing screw home mechanism strength for the right knee to reduce pain with prolonged activities like walking, running and instructing fitness classes. Patient will continue to benefit from skilled PT services to modify and progress therapeutic interventions, address functional mobility deficits, address ROM deficits, address strength deficits, analyze and address soft tissue restrictions, analyze and cue movement patterns, analyze and modify body mechanics/ergonomics, assess and modify postural abnormalities, address imbalance/dizziness and instruct in home and community integration to attain remaining goals. [x]  See Plan of Care  []  See progress note/recertification  []  See Discharge Summary         Progress towards goals / Updated goals:  Short Term Goals: To be accomplished in 1 weeks:  1. Pt will be compliant with a verbal HEP to improve knee sx.    (KT to facilitate Pes and Pes Isometrics/SLR with ER)  MET (3/9/22)  Long Term Goals: To be accomplished in 6 weeks:  1. Pt will increase FOTO score by 11  pts to improve knee sx. 2. Pt will decrease right knee pain to <2/10 to ease with functional activities. Progressing 0/10 last two sessions (3/9/22)  3. Pt will report >70% improvement in sx to ease with performing activities as a . 4. Pt will increase hip extension and abd to 5/5 to ease with Activity Tolerance.  Progressing working on super sets with glute exercises (3/9/22)    PLAN  [x]  Upgrade activities as tolerated     [x]  Continue plan of care  []  Update interventions per flow sheet       []  Discharge due to:_  []  Other:_      Karie Galvan PTA 3/9/2022  7:36 AM    Future Appointments   Date Time Provider Yoana Gottlieb   3/9/2022  8:15 AM Texie Slipper, PTA [facility]   3/14/2022  9:00 AM Texie Slipper, PTA [facility]   3/17/2022  2:15 PM Enid Avina, PT [facility]   3/21/2022  8:15 AM Texie Slipper, PTA [facility]   3/23/2022 11:15 AM Enid Avian, PT [facility]   3/28/2022  9:45 AM Texie Slipper, PTA [facility]   3/30/2022  9:00 AM Texie Slipper, PTA [facility]

## 2022-03-14 ENCOUNTER — HOSPITAL ENCOUNTER (OUTPATIENT)
Dept: PHYSICAL THERAPY | Age: 52
Discharge: HOME | End: 2022-03-14
Payer: COMMERCIAL

## 2022-03-14 PROCEDURE — 97140 MANUAL THERAPY 1/> REGIONS: CPT

## 2022-03-14 PROCEDURE — 97110 THERAPEUTIC EXERCISES: CPT

## 2022-03-14 NOTE — PROGRESS NOTES
PT DAILY TREATMENT NOTE     Patient Name: Jackie Pi  Date:3/14/2022  : 1970  [x]  Patient  Verified  Payor: BLUE CROSS / Plan: [facility] PPO / Product Type: PPO /    In time: 9:05   Out time: 9:50  Total Treatment Time (min): 45  Visit #: 4 of 12    Medicare/BCBS Only   Total Timed Codes (min):  45 1:1 Treatment Time:  45       Treatment Area: Pain in right knee [M25.561]    SUBJECTIVE  Pain Level (0-10 scale): 0/10   Any medication changes, allergies to medications, adverse drug reactions, diagnosis change, or new procedure performed?: [x] No    [] Yes (see summary sheet for update)  Subjective functional status/changes:   [] No changes reported  Pt reports an increase in back pain from Friday core class to Saturday that increased her back to 8/10 pain to which she has had to take ibuprofen 800 mg to address. She states pain would be worse in sitting and better with good posture or standing. She did not report symptoms down the leg but has had episodes in the past of left sciatic symptoms with sitting and right sciatic symptoms during pregnancy. She reports less pain today but also took medication this morning. Her right knee hasn't been bothering her but the medication could be masking the symptoms. 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          With   [] TE   [] TA   [] neuro   [] other: Patient Education: [x] Review HEP    [] Progressed/Changed HEP based on:   [] positioning   [] body mechanics   [] transfers   [] heat/ice application    [] other:      Other Objective/Functional Measures:   Educated on directional preference but unable to test given pt not currently having pain symptoms from taking ibuprofen  Suspect disc issue given subjective about sitting versus standing so educated on prone props and prone exercises this visit but instructed pt to be aware to let us know on Thursday appt  Educated about sitting posture with l/s support, sitting on stability ball, or performing desk work standing if able to reduce flexion moment of the spine  No increased pain during session  (+) sciatic nerve glides bilaterally in supine (left felt a nerve pull during foot DF; right felt some numbness in great toe with foot DF) all symptoms reduced with repetitive motions  Left HS tighter compared to right noted during SLR unable to maintain knee extension so performed HS stretch long sitting first    Pain Level (0-10 scale) post treatment: 0/10    ASSESSMENT/Changes in Function: Pt pain reduction of the right knee is skewed due to current intake of ibuprofen for onset of back pain post a class on Friday with increased pain leading into Saturday up to 8/10 pain across the low back. She has reduction of pain with standing, walking and erect posture but increased pain in sitting. She continues to demonstrate a pelvic obliquity and was educated on self correction. Will continue to work on core with hip/knee strengthening in effort to control back and knee pain while working towards established goals.      Patient will continue to benefit from skilled PT services to modify and progress therapeutic interventions, address functional mobility deficits, address ROM deficits, address strength deficits, analyze and address soft tissue restrictions, analyze and cue movement patterns, analyze and modify body mechanics/ergonomics, assess and modify postural abnormalities, address imbalance/dizziness and instruct in home and community integration to attain remaining goals. [x]  See Plan of Care  []  See progress note/recertification  []  See Discharge Summary         Progress towards goals / Updated goals:  Short Term Goals: To be accomplished in 1 weeks:  1. Pt will be compliant with a verbal HEP to improve knee sx. (KT to facilitate Pes and Pes Isometrics/SLR with ER)  MET (3/9/22)  Long Term Goals: To be accomplished in 6 weeks:  1. Pt will increase FOTO score by 11  pts to improve knee sx. 2. Pt will decrease right knee pain to <2/10 to ease with functional activities. Progressing 0/10 last two sessions (3/9/22)  3. Pt will report >70% improvement in sx to ease with performing activities as a . 4. Pt will increase hip extension and abd to 5/5 to ease with Activity Tolerance.  Progressing working on super sets with glute exercises (3/9/22)    PLAN  [x]  Upgrade activities as tolerated     [x]  Continue plan of care  []  Update interventions per flow sheet       []  Discharge due to:_  []  Other:_      Stacia Aase, DANIELLE 3/14/2022  7:36 AM    Future Appointments   Date Time Provider Yoana Gottlieb   3/14/2022  9:00 AM Brady Gipson, PTA [facility]   3/17/2022  2:15 PM Nicolasa Clements, PT [facility]   3/21/2022  8:15 AM Brady Gipson, PTA [facility]   3/23/2022 11:15 AM Nicolasa Clements, PT [facility]   3/28/2022  9:45 AM Brady Gipson, PTA [facility]   3/30/2022  9:00 AM Brady Gipson, PTA [facility]

## 2022-03-17 ENCOUNTER — HOSPITAL ENCOUNTER (OUTPATIENT)
Dept: PHYSICAL THERAPY | Age: 52
Discharge: HOME | End: 2022-03-17
Payer: COMMERCIAL

## 2022-03-17 PROCEDURE — 97112 NEUROMUSCULAR REEDUCATION: CPT

## 2022-03-17 PROCEDURE — 97110 THERAPEUTIC EXERCISES: CPT

## 2022-03-17 PROCEDURE — 97530 THERAPEUTIC ACTIVITIES: CPT

## 2022-03-17 NOTE — PROGRESS NOTES
PT DAILY TREATMENT NOTE - [facility]     Patient Name: Marybeth Campos  Date:3/17/2022  : 1970  [x]  Patient  Verified  Payor: BLUE CROSS / Plan: [facility] PPO / Product Type: PPO /    In time:220  Out time:315  Total Treatment Time (min): 55  Visit #: 5 of     Treatment Area: Pain in right knee [M25.561]    SUBJECTIVE  Pain Level (0-10 scale): 0/10, 3/10 LS  Any medication changes, allergies to medications, adverse drug reactions, diagnosis change, or new procedure performed?: [x] No    [] Yes (see summary sheet for update)  Subjective functional status/changes:   [] No changes reported  Boot camp last night flared up some LS pain. No knee pain but feels it's and underlying issue causing LS sx. Doing her ex's as recommended and feeling a positive difference.      OBJECTIVE    Modality rationale: decrease pain to improve the patients ability to ease with adl's   Min Type Additional Details    [] Estim:  []Unatt       []IFC  []Premod                        []Other:  []w/ice   []w/heat  Position:  Location:    [] Estim: []Att    []TENS instruct  []NMES                    []Other:  []w/US   []w/ice   []w/heat  Position:  Location:    []  Traction: [] Cervical       []Lumbar                       [] Prone          []Supine                       []Intermittent   []Continuous Lbs:  [] before manual  [] after manual    []  Ultrasound: []Continuous   [] Pulsed                           []1MHz   []3MHz W/cm2:  Location:    []  Iontophoresis with dexamethasone         Location: [] Take home patch   [] In clinic   10 []  Ice     [x]  heat  []  Ice massage  []  Laser   []  Anodyne Position:seated  Location:LS    []  Laser with stim  []  Other:  Position:  Location:    []  Vasopneumatic Device Pressure:       [] lo [] med [] hi   Temperature: [] lo [] med [] hi   [] Skin assessment post-treatment:  []intact []redness- no adverse reaction        20 min Therapeutic Exercise:  [] See flow sheet : Rationale: increase ROM, increase strength, improve coordination and improve balance to improve the patients ability to 15    15 min Therapeutic Activity:  []  See flow sheet :   Rationale: improve coordination and improve balance  to improve the patients ability to ease with performing job duties     10 min Neuromuscular Re-education:  []  See flow sheet :core re ed on Oov. Rationale: increase proprioception and core strength  to improve the patients ability to improve core activation          With   [] TE   [] TA   [] neuro   [x] other: Patient Education: [x] Review HEP    [] Progressed/Changed HEP based on:   [] positioning   [] body mechanics   [] transfers   [] heat/ice application    [x] other: Pt ed: get [employer] and change for intermittent desk chair. Wear castelan to teach aerobics classes. Other Objective/Functional Measures: Initial TTP decreased after core activation including activities on the oov.    -Pall of press (Oov)/LE March on Oov (W/O UE's)  SB -clocks. CW/CCW, pelvic tilts, ant/posterior. Left LE shaking during SLR with ER .  _wall lean stretch.   -Wall squat hold with BBK. 2x30 sec  Pain Level (0-10 scale) post treatment: 1-2/10    ASSESSMENT/Changes in Function: Progressing well. Pt reports she has not had to take any ibu full strength since last last visit. She invested in new shoes and recommended to switch shoes for her class. Pt will get a swiss ball for intermittent desk seating while working from home. Patient will continue to benefit from skilled PT services to modify and progress therapeutic interventions, address functional mobility deficits, address ROM deficits, address strength deficits, analyze and address soft tissue restrictions, analyze and cue movement patterns, analyze and modify body mechanics/ergonomics, assess and modify postural abnormalities and address imbalance/dizziness to attain remaining goals.      []  See Plan of Care  []  See progress note/recertification  []  See Discharge Summary         Progress towards goals / Updated goals:      PLAN  []  Upgrade activities as tolerated     []  Continue plan of care  []  Update interventions per flow sheet       []  Discharge due to:_  []  Other:_      Pete To, PT 3/17/2022  2:18 PM    Future Appointments   Date Time Provider Yoana Gottlieb   3/21/2022  8:15 AM Fitz Ibrahim, PTA [facility]   3/23/2022 11:15 AM Catrachita Ritter, PT [id number]   3/28/2022  9:45 AM Fitz Ibrahim, PTA [facility]   3/30/2022  9:00 AM Fitz Ibrahim, PTA [facility]

## 2022-03-19 PROBLEM — D36.9 INTRADUCTAL PAPILLOMA: Status: ACTIVE | Noted: 2020-03-11

## 2022-03-21 ENCOUNTER — HOSPITAL ENCOUNTER (OUTPATIENT)
Dept: PHYSICAL THERAPY | Age: 52
Discharge: HOME | End: 2022-03-21
Payer: COMMERCIAL

## 2022-03-21 PROCEDURE — 97112 NEUROMUSCULAR REEDUCATION: CPT

## 2022-03-21 PROCEDURE — 97530 THERAPEUTIC ACTIVITIES: CPT

## 2022-03-21 PROCEDURE — 97110 THERAPEUTIC EXERCISES: CPT

## 2022-03-21 NOTE — PROGRESS NOTES
PT DAILY TREATMENT NOTE - [facility]     Patient Name: Destiny Myers  Date:3/21/2022  : 1970  [x]  Patient  Verified  Payor: BLUE CROSS / Plan: [facility] PPO / Product Type: PPO /    In time: 8:20  Out time: 9:05  Total Treatment Time (min): 45  Visit #: 6 of     Treatment Area: Pain in right knee [M25.561]    SUBJECTIVE  Pain Level (0-10 scale): 0/10   Any medication changes, allergies to medications, adverse drug reactions, diagnosis change, or new procedure performed?: [x] No    [] Yes (see summary sheet for update)  Subjective functional status/changes:   [] No changes reported  Pt reports no current pain just some minor pull in the right inner thigh over the weekend which she attributes to the new workout last week. She didn't do much additional to her classes from over the weekend in regards to stretching or other exercises. She notes a little residual soreness in the right low back that is calming down.      OBJECTIVE      15 min Therapeutic Exercise:  [x] See flow sheet :   Rationale: increase ROM, increase strength, improve coordination and improve balance to improve the patients ability to 15    10 min Therapeutic Activity:  [x]  See flow sheet : jump holds from plyo box; lunges with abduction   Rationale: improve coordination and improve balance  to improve the patients ability to ease with performing job duties     20 min Neuromuscular Re-education:  [x]  See flow sheet : banded perturbations in SLS, static lunge, captain morgans for glute activation; OOV exercises   Rationale: increase proprioception and core strength  to improve the patients ability to improve core activation          With   [] TE   [] TA   [] neuro   [] other: Patient Education: [x] Review HEP    [] Progressed/Changed HEP based on:   [] positioning   [] body mechanics   [] transfers   [] heat/ice application    [] other:      Other Objective/Functional Measures:  No pelvic obliquity  Standing jumps demonstrates right genu valgus collapse compared to left (focus on sticking landing from 12\" box)  Educated on working on [employer] exercises with glute activation for return to running  Added captain meredith for glute stability  Added TB pulls in various positions at knee to engage glutes (static lunge, SLS, etc)  4+/5 hip abduction and extension on the right compared to 5/5 on the left    Pain Level (0-10 scale) post treatment: 0/10    ASSESSMENT/Changes in Function: Pt demonstrates pain reduction in the knee and back. She still has genu valgus collapse on the right with jumps that would increase right medial knee pain with return to running. She will need to work on progressive right LE loading in standing with glute engagement in order to begin return to running activities to decrease chance of re-irritation of knee pain. Patient will continue to benefit from skilled PT services to modify and progress therapeutic interventions, address functional mobility deficits, address ROM deficits, address strength deficits, analyze and address soft tissue restrictions, analyze and cue movement patterns, analyze and modify body mechanics/ergonomics, assess and modify postural abnormalities and address imbalance/dizziness to attain remaining goals. []  See Plan of Care  []  See progress note/recertification  []  See Discharge Summary         Progress towards goals / Updated goals:  Short Term Goals: To be accomplished in 1 weeks:  1. Pt will be compliant with a verbal HEP to improve knee sx. (KT to facilitate Pes and Pes Isometrics/SLR with ER)  MET (3/9/22)  Long Term Goals: To be accomplished in 6 weeks:  1. Pt will increase FOTO score by 11  pts to improve knee sx. 2. Pt will decrease right knee pain to <2/10 to ease with functional activities. Progressing 0/10 last two sessions (3/9/22)  3. Pt will report >70% improvement in sx to ease with performing activities as a .    4. Pt will increase hip extension and abd to 5/5 to ease with Activity Tolerance.  4+/5 right; 5/5 left      PLAN  [x]  Upgrade activities as tolerated     [x]  Continue plan of care  []  Update interventions per flow sheet       []  Discharge due to:_  []  Other:_      Katia Saucedo PTA 3/21/2022  2:18 PM    Future Appointments   Date Time Provider Yoana Gottlieb   3/21/2022  8:15 AM Fredis Campos PTA [facility]   3/23/2022 11:15 AM Ayan Morillo, PT [id number]   3/28/2022  9:45 AM Fredis Campos PTA [facility]   3/30/2022  9:00 AM Fredis Campos PTA [facility]

## 2022-03-23 ENCOUNTER — HOSPITAL ENCOUNTER (OUTPATIENT)
Dept: PHYSICAL THERAPY | Age: 52
Discharge: HOME | End: 2022-03-23
Payer: COMMERCIAL

## 2022-03-23 PROCEDURE — 97110 THERAPEUTIC EXERCISES: CPT

## 2022-03-23 PROCEDURE — 97112 NEUROMUSCULAR REEDUCATION: CPT

## 2022-03-23 NOTE — PROGRESS NOTES
[facility] [place] [employer]  [address] [place] [address]  [phone] fax        Physical Therapy Progress Note    Patient name: Chris Fierro Start of Care: 2022   Referral source: Natasha Marcus,* : 1970                Medical Diagnosis: Pain in right knee [M25.561]  Payor: [facility] / Plan: [facility] PPO / Product Type: PPO /  Onset Date:6 months                Treatment Diagnosis: Right Knee Pain   Prior Hospitalization: see medical history Provider#: [id number]   Medications: Verified on Patient summary List    Comorbidities: HTN   Prior Level of Function: . Visits from Start of Care: 7    Missed Visits: 0    Established Goals:         Excellent           Good         Limited           None  [x] Increased ROM   []  [x]  []  []  [x] Increased Strength  []  [x]  []  []  [x] Increased Mobility  []  [x]  []  []   [x] Decreased Pain   []  [x]  []  []  [] Decreased Swelling  []  []  []  []    Key Functional Changes: Minimal to no Right Knee pain, improved strength. New shoes with good arch support. Postural Education. Updated Goals: to be achieved in 4 weeks:   1. Pt will increase FOTO score by 11  pts to improve knee sx. Current Status: Progressing FOTO =77, from 70 at eval.      2. Pt will continue to have decrease right knee pain to <1/10 to ease with functional activities. 3. Pt will report >85% improvement in sx to ease with performing activities as a . 4. Pt will increase hip extension and abd to 5/5 to ease with Activity Tolerance.          ASSESSMENT/RECOMMENDATIONS:Pt reports 70-80% improvement to her right knee so far. Right knee pain is non existent currently . Pt does exhibit a Genu Valgus as well as decreased right hip strength that could be attributing to the onset of knee pain.  Pt presents with decreased glute activation, decreased right hip strength and pelvic obliquity that has been assessed and corrected. [x]Continue therapy per initial plan/protocol at a frequency of 1- 2 x per week for 3 weeks  []Continue therapy with the following recommended changes:_____________________      _____________________________________________________________________  []Discontinue therapy progressing towards or have reached established goals  []Discontinue therapy due to lack of appreciable progress towards goals  []Discontinue therapy due to lack of attendance or compliance  []Await Physician's recommendations/decisions regarding therapy  []Other:________________________________________________________________    Thank you for this referral.   Ida Summers, PT 3/23/2022 11:11 AM    NOTE TO PHYSICIAN:  PLEASE COMPLETE THE ORDERS BELOW AND   FAX TO [facility]: [phone]  If you are unable to process this request in 24 hours please contact our office: [phone] I have read the above report and request that my patient continue as recommended. ? I have read the above report and request that my patient continue therapy with the following changes/special instructions:____________________________________  ? I have read the above report and request that my patient be discharged from therapy.     Physicians signature: ______________________________Date: ______Time:______     Reina Dahl,*

## 2022-03-23 NOTE — PROGRESS NOTES
PT DAILY TREATMENT NOTE - [facility]     Patient Name: Kash Weaver  Date:3/23/2022  : 1970  [x]  Patient  Verified  Payor: BLUE CROSS / Plan: [facility] PPO / Product Type: PPO /    In time:1123  Out time:1220  Total Treatment Time (min): 62  Visit #: 7 of     Treatment Area: Pain in right knee [M25.561]    SUBJECTIVE   Pain Level (0-10 scale): 0/10  Any medication changes, allergies to medications, adverse drug reactions, diagnosis change, or new procedure performed?: [x] No    [] Yes (see summary sheet for update)  Subjective functional status/changes:   [] No changes reported  Ptrepots she is 70-80% improved     OBJECTIVE      37 min Therapeutic Exercise:  [] See flow sheet :   Rationale: increase ROM, increase strength, improve coordination and improve balance to improve the patients ability to ease with adl's     20 min Neuromuscular Re-education:  []  See flow sheet :   Rationale: increase ROM, increase strength, improve coordination and improve balance  to improve the patients ability to ease with adl's          With   [] TE   [] TA   [] neuro   [] other: Patient Education: [x] Review HEP    [] Progressed/Changed HEP based on:   [] positioning   [] body mechanics   [] transfers   [] heat/ice application    [] other:      Other Objective/Functional Measures: reports 70-80%   Hip abductions on OOV with Blue Tb was a challenge  Used green Tb to prevent Valgus collapse on right during Eccentric step down. FOTO=77    Pain Level (0-10 scale) post treatment: 0/10    ASSESSMENT/Changes in Function: Progressing steadily. Pt continues to demonstrate right >left hip weakness throughout session.      Patient will continue to benefit from skilled PT services to modify and progress therapeutic interventions, address functional mobility deficits, address ROM deficits, address strength deficits, analyze and address soft tissue restrictions, analyze and cue movement patterns, analyze and modify body mechanics/ergonomics, assess and modify postural abnormalities and address imbalance/dizziness to attain remaining goals. []  See Plan of Care  []  See progress note/recertification  []  See Discharge Summary         Progress towards goals / Updated goals:  Short Term Goals: To be accomplished in 1 weeks:  1. Pt will be compliant with a verbal HEP to improve knee sx. (KT to facilitate [employer]/SLR with ER)  MET (3/9/22)  Long Term Goals: To be accomplished in 6 weeks:  1. Pt will increase FOTO score by 11  pts to improve knee sx. Progressing FOTO =77, from 70 at eval.     2. Pt will decrease right knee pain to <2/10 to ease with functional activities. Progressing 0/10 last two sessions (3/9/22)  3. Pt will report >70% improvement in sx to ease with performing activities as a . MET. 3/23/22  4. Pt will increase hip extension and abd to 5/5 to ease with Activity Tolerance.  4+/5 right; 5/5 left       PLAN  []  Upgrade activities as tolerated     []  Continue plan of care  []  Update interventions per flow sheet       []  Discharge due to:_  []  Other:_      Fabian Martinez, PT 3/23/2022  11:10 AM    Future Appointments   Date Time Provider Yoana Gottlieb   3/23/2022 11:15 AM Romie Smith, PT [facility]   3/28/2022  9:45 AM Mckay Arthur, PTA [facility]   3/30/2022  9:00 AM Mckay Arthur PTA [facility]

## 2022-03-28 ENCOUNTER — HOSPITAL ENCOUNTER (OUTPATIENT)
Dept: PHYSICAL THERAPY | Age: 52
Discharge: HOME | End: 2022-03-28
Payer: COMMERCIAL

## 2022-03-28 PROCEDURE — 97110 THERAPEUTIC EXERCISES: CPT

## 2022-03-28 PROCEDURE — 97112 NEUROMUSCULAR REEDUCATION: CPT

## 2022-03-28 NOTE — PROGRESS NOTES
PT DAILY TREATMENT NOTE - [facility]     Patient Name: Shekhar Kiran  Date:3/28/2022  : 1970  [x]  Patient  Verified  Payor: BLUE CROSS / Plan: [facility] PPO / Product Type: PPO /    In time: 9:52  Out time: 10:31  Total Treatment Time (min): 39  Visit #:  1 of 6    Treatment Area: Pain in right knee [M25.561]    SUBJECTIVE   Pain Level (0-10 scale): 0/10  Any medication changes, allergies to medications, adverse drug reactions, diagnosis change, or new procedure performed?: [x] No    [] Yes (see summary sheet for update)  Subjective functional status/changes:   [] No changes reported  Pt reports no current pain and 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 She states no pain after classes now and being much more aware of not letting her knee collapse. She is wondering when she may be able to try some running.        OBJECTIVE    29 min Therapeutic Exercise:  [x] See flow sheet :   Rationale: increase ROM, increase strength, improve coordination and improve balance to improve the patients ability to ease with adl's     10 min Neuromuscular Re-education:  [x]  See flow sheet : OOV; glute activation exercises   Rationale: increase ROM, increase strength, improve coordination and improve balance  to improve the patients ability to ease with adl's          With   [] TE   [] TA   [] neuro   [] other: Patient Education: [x] Review HEP    [] Progressed/Changed HEP based on:   [] positioning   [] body mechanics   [] transfers   [] heat/ice application    [] other:      Other Objective/Functional Measures:   Continues to have valgus collapse with eccentric step downs  Added TB static lunge with hip abduction and then holding abduction with TB perturbations  Challenged with captain morgans and OOV hip single leg abduction with TB  Added foam roller to back to reduce trunk rotation with bird dogs  No increased pain during session    Pain Level (0-10 scale) post treatment: 0/10    ASSESSMENT/Changes in Function: Pt progressing well incorporating hip strengthening exercises into fitness instruction at the [facility]. She is more aware of reducing genu valgus during exercises and improving time spent stretching to reduce pelvic obliquity and low back pain. She continues to need to work on closed chain strengthening in order to progress back to running activities. Patient will continue to benefit from skilled PT services to modify and progress therapeutic interventions, address functional mobility deficits, address ROM deficits, address strength deficits, analyze and address soft tissue restrictions, analyze and cue movement patterns, analyze and modify body mechanics/ergonomics, assess and modify postural abnormalities and address imbalance/dizziness to attain remaining goals. [x]  See Plan of Care  [x]  See progress note/recertification  []  See Discharge Summary         Updated Goals: to be achieved in 4 weeks:  1. Pt will increase FOTO score by 11  pts to improve knee sx.   Current Status: Progressing FOTO =77, from 70 at eval.   2. Pt will continue to have decrease right knee pain to <1/10 to ease with functional activities. 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     3. Pt will report >85% improvement in sx to ease with performing activities as a .   4. Pt will increase hip extension and abd to 5/5 to ease with Activity Tolerance.      PLAN  [x]  Upgrade activities as tolerated     [x]  Continue plan of care  []  Update interventions per flow sheet       []  Discharge due to:_  []  Other:_      Sunni Austin PTA 3/28/2022  11:10 AM    Future Appointments   Date Time Provider Yoana Gottlieb   3/28/2022  9:45 AM Rozell Osler, PTA [facility]   3/30/2022  9:00 AM Rozell Osler, PTA [facility]

## 2022-03-30 ENCOUNTER — HOSPITAL ENCOUNTER (OUTPATIENT)
Dept: PHYSICAL THERAPY | Age: 52
Discharge: HOME | End: 2022-03-30
Payer: COMMERCIAL

## 2022-03-30 PROCEDURE — 97110 THERAPEUTIC EXERCISES: CPT

## 2022-03-30 NOTE — PROGRESS NOTES
PT DAILY TREATMENT NOTE - [facility]     Patient Name: Renee Alvarado  Date:3/30/2022  : 1970  [x]  Patient  Verified  Payor: BLUE CROSS / Plan: [facility] PPO / Product Type: PPO /    In time: 9:51  Out time: 10:35   Total Treatment Time (min): 44   Visit #: 2 of 6    Treatment Area: Pain in right knee [M25.561]    SUBJECTIVE   Pain Level (0-10 scale): 0/10  Any medication changes, allergies to medications, adverse drug reactions, diagnosis change, or new procedure performed?: [x] No    [] Yes (see summary sheet for update)  Subjective functional status/changes:   [] No changes   Pt reports doing a little running just a lap in between exercises with her class the other day. 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 She has had less pain overall in her knee and back and is happy with her progress thus far. OBJECTIVE    44 min Therapeutic Exercise:  [x] See flow sheet :   Rationale: increase ROM, increase strength, improve coordination and improve balance to improve the patients ability to ease with adl's       With   [] TE   [] TA   [] neuro   [] other: Patient Education: [x] Review HEP    [] Progressed/Changed HEP based on:   [] positioning   [] body mechanics   [] transfers   [] heat/ice application    [] other:      Other Objective/Functional Measures: Added wall ball clams and psoas march to assist with return to running  Added core exercises including mountain climbers and DKTC with SB  No increased pain during session  Challenged with static lunge hip abduction with perturbations  Stretching of all muscles at end of session to reduce DOMS    Pain Level (0-10 scale) post treatment: 0/10    ASSESSMENT/Changes in Function: Pt progressing well towards all established goals. She has overall decreased pain in her knee and back. There is a heavy focus on closed chain glute strengthening to reduce valgus collapse for return to running.  Will continue to incorporate new exercises in preparation for higher level dynamic strengthening for reduced pain post classes and with running. Patient will continue to benefit from skilled PT services to modify and progress therapeutic interventions, address functional mobility deficits, address ROM deficits, address strength deficits, analyze and address soft tissue restrictions, analyze and cue movement patterns, analyze and modify body mechanics/ergonomics, assess and modify postural abnormalities and address imbalance/dizziness to attain remaining goals. [x]  See Plan of Care  [x]  See progress note/recertification  []  See Discharge Summary         Updated Goals: to be achieved in 4 weeks:  1. Pt will increase FOTO score by 11  pts to improve knee sx.   Current Status: Progressing FOTO =77, from 70 at eval.   2. Pt will continue to have decrease right knee pain to <1/10 to ease with functional activities. 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     3. Pt will report >85% improvement in sx to ease with performing activities as a .   4. Pt will increase hip extension and abd to 5/5 to ease with Activity Tolerance.      PLAN  [x]  Upgrade activities as tolerated     [x]  Continue plan of care  []  Update interventions per flow sheet       []  Discharge due to:_  []  Other:_      James Javier PTA 3/30/2022  11:10 AM    Future Appointments   Date Time Provider Yoana Gottlieb   3/30/2022  9:45 AM Christophe Abel, PTA [facility]   4/5/2022  9:00 AM Christophe bAel PTA [facility]   4/13/2022  1:30 PM Christophe Abel PTA [facility]   4/19/2022  3:45 PM Ashu Montez, PT [facility]   4/26/2022  9:00 AM Christophe Abel, DANIELLE [facility]

## 2022-04-05 ENCOUNTER — HOSPITAL ENCOUNTER (OUTPATIENT)
Dept: PHYSICAL THERAPY | Age: 52
Discharge: HOME | End: 2022-04-05
Payer: COMMERCIAL

## 2022-04-05 PROCEDURE — 97110 THERAPEUTIC EXERCISES: CPT

## 2022-04-05 NOTE — PROGRESS NOTES
PT DAILY TREATMENT NOTE - [facility]     Patient Name: Alex Rowell  Date:2022  : 1970  [x]  Patient  Verified  Payor: BLUE CROSS / Plan: [facility] PPO / Product Type: PPO /    In time: 9:05  Out time:  9:45  Total Treatment Time (min):  40  Visit #: 3 of 6    Treatment Area: Pain in right knee [M25.561]    SUBJECTIVE   Pain Level (0-10 scale): 1-2/10  Any medication changes, allergies to medications, adverse drug reactions, diagnosis change, or new procedure performed?: [x] No    [] Yes (see summary sheet for update)  Subjective functional status/changes:   [] No changes   Pt c/o of continued right inner knee pain but different than the pain she felt before (used to be a tweaked feeling but now its just an occasional dull, fatigue feeling). She has been compliant with her HEP and continues to exercise. 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      OBJECTIVE    40 min Therapeutic Exercise:  [x] See flow sheet :   Rationale: increase ROM, increase strength, improve coordination and improve balance to improve the patients ability to ease with adls       With   [] TE   [] TA   [] neuro   [] other: Patient Education: [x] Review HEP    [] Progressed/Changed HEP based on:   [] positioning   [] body mechanics   [] transfers   [] heat/ice application    [] other:      Other Objective/Functional Measures:   No pelvic obliquity found during assessment    Added weighted soleus \"creep\" walks, single leg prisoner calf raises, step ups with a plus, [facility] split squats, and psoas march with elevated bridge to assist with return to running  Added core exercises including side planks with rows (PTB) and flamingo twists    Decreased pain during session  Challenged with bird/dog with foam roll vertical to reduce trunk rotation      Pain Level (0-10 scale) post treatment: 0/10    ASSESSMENT/Changes in Function: Pt progressing well towards all established goals. She has overall decreased pain in her knee and back. Today's session focused on soleus and gastroc strength to return to running. Will continue to incorporate new exercises in preparation for higher level dynamic strengthening for reduced pain post classes and with running. Pt with improved awareness during classes to prevent genu valgus. Patient will continue to benefit from skilled PT services to modify and progress therapeutic interventions, address functional mobility deficits, address ROM deficits, address strength deficits, analyze and address soft tissue restrictions, analyze and cue movement patterns, analyze and modify body mechanics/ergonomics, assess and modify postural abnormalities and address imbalance/dizziness to attain remaining goals. [x]  See Plan of Care  [x]  See progress note/recertification  []  See Discharge Summary         Updated Goals: to be achieved in 4 weeks:  1. Pt will increase FOTO score by 11  pts to improve knee sx. Current Status: Progressing FOTO =77, from 70 at eval.   2. Pt will continue to have decrease right knee pain to <1/10 to ease with functional activities. 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 No pain today (4/5/22)  3. Pt will report >85% improvement in sx to ease with performing activities as a . 4. Pt will increase hip extension and abd to 5/5 to ease with Activity Tolerance. PLAN  [x]  Upgrade activities as tolerated     [x]  Continue plan of care  []  Update interventions per flow sheet       []  Discharge due to:_  [x]  Other:_  Check soreness following last session. QIAN Alarcon 4/5/2022  11:10 AM    I was present during the entire treatment, directing and participating in the treatment.    TAY Venegas     Future Appointments   Date Time Provider Yoana Gottlieb   4/5/2022  9:00 AM [facility]   4/13/2022  1:30 PM [facility]   4/19/2022 3:45 PM Clara Beasley, PT [facility]   4/26/2022  9:00 AM Rozell Osler, PTA [facility]

## 2022-04-13 ENCOUNTER — HOSPITAL ENCOUNTER (OUTPATIENT)
Dept: PHYSICAL THERAPY | Age: 52
Discharge: HOME | End: 2022-04-13
Payer: COMMERCIAL

## 2022-04-13 PROCEDURE — 97140 MANUAL THERAPY 1/> REGIONS: CPT

## 2022-04-13 PROCEDURE — 97110 THERAPEUTIC EXERCISES: CPT

## 2022-04-13 NOTE — PROGRESS NOTES
PT DAILY TREATMENT NOTE - [facility]     Patient Name: Ruslan Cortez  Date:2022  : 1970  [x]  Patient  Verified  Payor: BLUE CROSS / Plan: [facility] PPO / Product Type: PPO /    In time:1:35  Out time: 2:17  Total Treatment Time (min):  42  Visit #: 4 of 6    Treatment Area: Pain in right knee [M25.561]    SUBJECTIVE   Pain Level (0-10 scale): 0/10  Any medication changes, allergies to medications, adverse drug reactions, diagnosis change, or new procedure performed?: [x] No    [] Yes (see summary sheet for update)  Subjective functional status/changes:   [] No changes    Pt presented to therapy with no pain or complaints. She states she drove a total of 9 hours over the weekend to visit her grand baby. She reports a feeling of discomfort in the left hip while sitting on the passenger side of the car. OBJECTIVE    34 min Therapeutic Exercise:  [x] See flow sheet :    Rationale: increase ROM, increase strength, improve coordination and improve balance to improve the patients ability to ease with adls     8 min Manual Therapy:  [x] See flow sheet : MET for right AI, shot gun technique, and MET for a backward rotated sacrum right/left       With   [] TE   [] TA   [] neuro   [] other: Patient Education: [x] Review HEP    [] Progressed/Changed HEP based on:   [] positioning   [] body mechanics   [] transfers   [] heat/ice application    [] other:      Other Objective/Functional Measures:     Pt right knee valgus has significantly improved from 8\" step jump    Pt form is good and required no corrections    Pain Level (0-10 scale) post treatment: 0/10    ASSESSMENT/Changes in Function: Pt progressing well towards all established goals. She has overall decreased pain in her knee. Today's session focused on glute, soleus and gastroc strength to assist with return to running. Will continue to incorporate new exercises to decrease genu valgus for return to running.      Patient will continue to benefit from skilled PT services to modify and progress therapeutic interventions, address functional mobility deficits, address ROM deficits, address strength deficits, analyze and address soft tissue restrictions, analyze and cue movement patterns, analyze and modify body mechanics/ergonomics, assess and modify postural abnormalities and address imbalance/dizziness to attain remaining goals. [x]  See Plan of Care  [x]  See progress note/recertification  []  See Discharge Summary         Updated Goals: to be achieved in 4 weeks:  1. Pt will increase FOTO score by 11  pts to improve knee sx. Current Status: Progressing FOTO =77, from 70 at eval.   2. Pt will continue to have decrease right knee pain to <1/10 to ease with functional activities. 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 No pain today (4/5/22)  3. Pt will report >85% improvement in sx to ease with performing activities as a . 4. Pt will increase hip extension and abd to 5/5 to ease with Activity Tolerance. PLAN  [x]  Upgrade activities as tolerated     [x]  Continue plan of care  []  Update interventions per flow sheet       []  Discharge due to:_  []  Other:_      QIAN Hedrick 4/13/2022  11:10 AM    I was present during the entire treatment, directing and participating in the treatment.    TAY Sandhu     Future Appointments   Date Time Provider Yaona Gottlieb   4/13/2022  1:30 PM Nolberto Villela [facility]   4/19/2022  3:45 PM Alejandrina Odell PT [facility]   4/26/2022  9:00 AM Ghislaine Tompkins PTA [facility]

## 2022-04-19 ENCOUNTER — HOSPITAL ENCOUNTER (OUTPATIENT)
Dept: PHYSICAL THERAPY | Age: 52
Discharge: HOME | End: 2022-04-19
Payer: COMMERCIAL

## 2022-04-19 PROCEDURE — 97530 THERAPEUTIC ACTIVITIES: CPT

## 2022-04-19 PROCEDURE — 97110 THERAPEUTIC EXERCISES: CPT

## 2022-04-19 NOTE — PROGRESS NOTES
[facility] [place] [employer]  [address] [place] [address]  [phone] fax    Physician Update  [x] Progress Note  [x] Discharge Summary  Patient name: Alex Rowell Start of Care: 2022   Referral source: Enoch Hutton,* : 1970   Medical/Treatment Diagnosis: Pain in right knee [M25.561]  Payor: [facility] / Plan: [facility] PPO / Product Type: PPO /  Onset Date:6 months            Prior Hospitalization: see medical history Provider#: [id number]   Medications: Verified on Patient Summary List    Comorbidities: HTN  Prior Level of Function: .    Visits from Start of Care: 13    Missed Visits: 0    Status at Evaluation/Last Progress Note: Pt reports 70-80% improvement to her right knee so far. Right knee pain is non existent currently . Pt does exhibit a Genu Valgus as well as decreased right hip strength that could be attributing to the onset of knee pain. Pt presents with decreased glute activation, decreased right hip strength and pelvic obliquity that has been assessed and corrected. Progress towards Goals:   1. Pt will increase FOTO score by 11  pts to improve knee sx. Met 81/100  2. Pt will continue to have decrease right knee pain to <1/10 to ease with functional activities. Met 0-1/10  3. Pt will report >85% improvement in sx to ease with performing activities as a . Met 90%  4. Pt will increase hip extension and abd to 5/5 to ease with Activity Tolerance. Progressed 4+/5    Goals: to be achieved in 1 visit:  1. Patient will be compliant and independent with a final HEP in order to progress back into running without increased right knee pain. ASSESSMENT/RECOMMENDATIONS: Mrs. Rufina Lanes is making excellent progress towards goals in therapy meeting 3/4 final goals. She subjectively reports 90% improvement since start of care and averages 0-1/10 right knee pain.  She is highly compliant with her exercises and incorporating form corrections into her classes as a . She has been able to complete classes without increased pain and is going to start a slow walk/jog program to return to goal of running. Her FOTO significantly improved to 81/100 meeting her goal. Her hip extension and abduction strength increased to 4+/5. Skilled PT is medically necessary for one final visit to ensure independence with a final home program for continued maintenance and pain control as she progresses back to running and continues job as a . [x]Continue therapy per initial plan/protocol at a frequency of  1 x per week for 1 visit  []Continue therapy with the following recommended changes:_____________________      _____________________________________________________________________  []Discontinue therapy progressing towards or have reached established goals  []Discontinue therapy due to lack of appreciable progress towards goals  []Discontinue therapy due to lack of attendance or compliance  []Await Physician's recommendations/decisions regarding therapy  []Other:________________________________________________________________    Thank you for this referral.   Néstor Albarran, PTA 4/19/2022 4:53 PM  NOTE TO PHYSICIAN:  [address] TO [facility] Physical Therapy: [phone]  If you are unable to process this request in 24 hours please contact our office: [phone] I have read the above report and request that my patient continue as recommended. ? I have read the above report and request that my patient continue therapy with the following changes/special instructions:____________________________________  ? I have read the above report and request that my patient be discharged from therapy.     [de-identified] Signature:____________Date:_________TIME:________     Tiffany Drought,*  ** Signature, Date and Time must be completed for valid certification **

## 2022-04-19 NOTE — PROGRESS NOTES
PT DAILY TREATMENT NOTE - [facility]     Patient Name: Santiago Guzmán  [id number]  : 1970  [x]  Patient  Verified  Payor: BLUE CROSS / Plan: [facility] PPO / Product Type: PPO /    In time: 3:45  Out time:  4:35  Total Treatment Time (min): 50   Visit #: 5 of 6    Treatment Area: Pain in right knee [M25.561]    SUBJECTIVE   Pain Level (0-10 scale): 0/10  Any medication changes, allergies to medications, adverse drug reactions, diagnosis change, or new procedure performed?: [x] No    [] Yes (see summary sheet for update)  Subjective functional status/changes:   [] No changes   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 She hasn't tried to run a mile yet, just 1 lap running in her classes.       OBJECTIVE    40 min Therapeutic Exercise:  [x] See flow sheet :   Rationale: increase ROM, increase strength, improve coordination and improve balance to improve the patients ability to ease with adls    10 min Therapeutic Activity:  [x] See flow sheet :FOTO, objective measure, pt education for self MET   Rationale: increase ROM, increase strength, improve coordination and improve balance to improve the patients ability to ease with adls         With   [] TE   [] TA   [] neuro   [] other: Patient Education: [x] Review HEP    [] Progressed/Changed HEP based on:   [] positioning   [] body mechanics   [] transfers   [] heat/ice application    [] other:      Other Objective/Functional Measures:  FOTO 81/100  0-1/10 on average pain  3-4/10 at most for pain  90% overall improvement   MMT  Hip ABD right 4+/5  Hip Extension 4+/5      Pt displayed improved form for bird dogs and lunge exercises    Pain Level (0-10 scale) post treatment: 0/10    ASSESSMENT/Changes in Function:   See Physician update    Patient will continue to benefit from skilled PT services to modify and progress therapeutic interventions, address functional mobility deficits, address ROM deficits, address strength deficits, analyze and address soft tissue restrictions, analyze and cue movement patterns, analyze and modify body mechanics/ergonomics, assess and modify postural abnormalities and address imbalance/dizziness to attain remaining goals. [x]  See Plan of Care  [x]  See progress note/recertification  []  See Discharge Summary         Updated Goals: to be achieved in 4 weeks:  1. Pt will increase FOTO score by 11  pts to improve knee sx. Met (4/19/22)  Current Status: Progressing FOTO =77, from 70 at eval. 81/100 Met (4/19/22)  2. Pt will continue to have decrease right knee pain to <1/10 to ease with functional activities. 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 No pain today (4/5/22)   3. Pt will report >85% improvement in sx to ease with performing activities as a . 90% Met (4/19/22)  4. Pt will increase hip extension and abd to 5/5 to ease with Activity Tolerance. Progressing 4+/5 (4/19/22)    PLAN  [x]  Upgrade activities as tolerated     [x]  Continue plan of care  []  Update interventions per flow sheet       []  Discharge due to:_  [x]  Other:_   D/C next visit    Hayden Wilde, [address] [place] [address] 4/19/2022  11:10 AM    I was present during the entire treatment, directing and participating in the treatment.    TAY Neil     Future Appointments   Date Time Provider Yoana Gottlieb   4/26/2022  9:00 AM Dmitri Richard PTA [id number]

## 2022-04-26 ENCOUNTER — HOSPITAL ENCOUNTER (OUTPATIENT)
Dept: PHYSICAL THERAPY | Age: 52
Discharge: HOME | End: 2022-04-26
Payer: COMMERCIAL

## 2022-04-26 PROCEDURE — 97110 THERAPEUTIC EXERCISES: CPT

## 2022-04-26 NOTE — PROGRESS NOTES
PT DAILY TREATMENT NOTE - [facility]     Patient Name: Aime Mitchell  Date:2022  : 1970  [x]  Patient  Verified  Payor: BLUE CROSS / Plan: [facility] PPO / Product Type: PPO /    In time:9:05 Out time: 9:45  Total Treatment Time (min):  40  Visit #: 1 of 1    Treatment Area: Pain in right knee [M25.561]    SUBJECTIVE   Pain Level (0-10 scale): 0/10  Any medication changes, allergies to medications, adverse drug reactions, diagnosis change, or new procedure performed?: [x] No    [] Yes (see summary sheet for update)  Subjective functional status/changes:   [] No changes   Pt states she is able to run 2 laps around the gym during her aerobic classes. She stated during visit her ankles keep her from being able to balance better. OBJECTIVE    40 min Therapeutic Exercise:  [x] See flow sheet :    Rationale: increase ROM, increase strength, improve coordination and improve balance to improve the patients ability to ease with adls         With   [] TE   [] TA   [] neuro   [] other: Patient Education: [x] Review HEP    [] Progressed/Changed HEP based on:   [] positioning   [x] body mechanics   [] transfers   [] heat/ice application    [] other:      Other Objective/Functional Measures:     Performed left leg lengthening technique to correct left upslip  Gave updated HEP including but not limited to SL squats to chair, SL squats with opposite ball to wall, and static bridges with marches and able to perform without complications  Added ankle 4 way to promote stability to increase balance tolerance  Provided PTB for HEP    Pain Level (0-10 scale) post treatment: 0/10    ASSESSMENT/Changes in Function:   Pt met her goal.  She consistent with completion of her final HEP. Pt was given new exercises to continue progressing at home. She is ready for D/C secondary to meeting all goals and no pain while performing recreational activities.   Patient will continue to benefit from skilled PT services to    []  See Plan of Care  []  See progress note/recertification  [x]  See Discharge Summary         Updated Goals:  to be achieved in 1 visit:  1. Patient will be compliant and independent with a final HEP in order to progress back into running without increased right knee pain. MET 4/26/22     PLAN  []  Upgrade activities as tolerated     []  Continue plan of care  []  Update interventions per flow sheet       [x]  Discharge due to:_ Goals met and HEP compliant  []  Other:_      QIAN Stokes 4/26/2022  11:10 AM    I was present during the entire treatment, directing and participating in the treatment. TAY Mack     No future appointments.

## 2022-04-26 NOTE — PROGRESS NOTES
Physical Therapy Discharge Instructions      [facility] - Ivonne Monge  [address] [facility]  [phone] fax    Patient: Alex Rowell  : 1970      Continue Home Exercise Program 3-4 days a week      Continue with    [] Ice       [] Heat           Follow up with MD:     [] Upon completion of therapy     [x] As needed      Recommendations:     [x]   Return to activity with home program    [x]   Return to activity with the following modifications:       []Post Rehab Program    []Join Independent aquatic program     [x]Return to/join local gym        Additional Comments: Continue with hip strengthening and ankle strengthening for preparation for return to running.           Sunni Austin, PTA 2022 9:43 AM

## 2022-06-11 DIAGNOSIS — M22.2X1 PATELLOFEMORAL PAIN SYNDROME OF RIGHT KNEE: ICD-10-CM

## 2022-06-13 RX ORDER — MELOXICAM 7.5 MG/1
7.5 TABLET ORAL AS NEEDED
Qty: 30 TABLET | Refills: 1 | Status: SHIPPED | OUTPATIENT
Start: 2022-06-13

## 2022-10-27 NOTE — TELEPHONE ENCOUNTER
Called [facility] [place] per Dr. Maryan Alfred to notify her that there were no areas of concern seen on recent imaging. Patient verbalized understanding.     ----- Message from Hunter Valentine MD sent at 2/25/2021 11:28 AM EST -----  Please let her know there were no areas of concern seen on imaging.   Thanks     ----- Message -----  From: Florian, Rad Results In  Sent: 2/25/2021  11:21 AM EST  To: Hunter Valentine MD within normal limits

## 2022-11-30 DIAGNOSIS — M22.2X1 PATELLOFEMORAL PAIN SYNDROME OF RIGHT KNEE: ICD-10-CM

## 2022-12-05 RX ORDER — MELOXICAM 7.5 MG/1
7.5 TABLET ORAL AS NEEDED
Qty: 30 TABLET | Refills: 1 | Status: SHIPPED | OUTPATIENT
Start: 2022-12-05

## 2023-02-03 ENCOUNTER — TRANSCRIBE ORDERS (OUTPATIENT)
Facility: HOSPITAL | Age: 53
End: 2023-02-03

## 2023-02-03 DIAGNOSIS — Z12.31 SCREENING MAMMOGRAM, ENCOUNTER FOR: Primary | ICD-10-CM

## 2023-03-24 ENCOUNTER — HOSPITAL ENCOUNTER (OUTPATIENT)
Facility: HOSPITAL | Age: 53
Discharge: HOME | End: 2023-03-24
Payer: COMMERCIAL

## 2023-03-24 DIAGNOSIS — Z12.31 SCREENING MAMMOGRAM, ENCOUNTER FOR: ICD-10-CM

## 2023-03-24 PROCEDURE — 77063 BREAST TOMOSYNTHESIS BI: CPT

## 2023-12-14 ENCOUNTER — HOSPITAL ENCOUNTER (OUTPATIENT)
Facility: HOSPITAL | Age: 53
Discharge: HOME | End: 2023-12-14
Payer: COMMERCIAL

## 2023-12-14 LAB
ALBUMIN SERPL-MCNC: 4.2 G/DL (ref 3.4–5)
ALBUMIN/GLOB SERPL: 1.2 (ref 0.8–1.7)
ALP SERPL-CCNC: 47 U/L (ref 45–117)
ALT SERPL-CCNC: 34 U/L (ref 13–56)
ANION GAP SERPL CALC-SCNC: 4 MMOL/L (ref 3–18)
AST SERPL-CCNC: 22 U/L (ref 10–38)
BASOPHILS # BLD: 0 K/UL (ref 0–0.1)
BASOPHILS NFR BLD: 1 % (ref 0–2)
BILIRUB SERPL-MCNC: 0.6 MG/DL (ref 0.2–1)
BUN SERPL-MCNC: 14 MG/DL (ref 7–18)
BUN/CREAT SERPL: 17 (ref 12–20)
CALCIUM SERPL-MCNC: 9.3 MG/DL (ref 8.5–10.1)
CHLORIDE SERPL-SCNC: 103 MMOL/L (ref 100–111)
CHOLEST SERPL-MCNC: 166 MG/DL
CO2 SERPL-SCNC: 31 MMOL/L (ref 21–32)
CREAT SERPL-MCNC: 0.83 MG/DL (ref 0.6–1.3)
DIFFERENTIAL METHOD BLD: ABNORMAL
EOSINOPHIL # BLD: 0.1 K/UL (ref 0–0.4)
EOSINOPHIL NFR BLD: 2 % (ref 0–5)
ERYTHROCYTE [DISTWIDTH] IN BLOOD BY AUTOMATED COUNT: 11.8 % (ref 11.6–14.5)
GLOBULIN SER CALC-MCNC: 3.4 G/DL (ref 2–4)
GLUCOSE SERPL-MCNC: 105 MG/DL (ref 74–99)
HCT VFR BLD AUTO: 44.7 % (ref 35–45)
HDLC SERPL-MCNC: 80 MG/DL (ref 40–60)
HDLC SERPL: 2.1 (ref 0–5)
HGB BLD-MCNC: 15.5 G/DL (ref 12–16)
IMM GRANULOCYTES # BLD AUTO: 0 K/UL (ref 0–0.04)
IMM GRANULOCYTES NFR BLD AUTO: 0 % (ref 0–0.5)
LDLC SERPL CALC-MCNC: 70.6 MG/DL (ref 0–100)
LIPID PANEL: ABNORMAL
LYMPHOCYTES # BLD: 2 K/UL (ref 0.9–3.6)
LYMPHOCYTES NFR BLD: 38 % (ref 21–52)
MCH RBC QN AUTO: 35.1 PG (ref 24–34)
MCHC RBC AUTO-ENTMCNC: 34.7 G/DL (ref 31–37)
MCV RBC AUTO: 101.1 FL (ref 78–100)
MONOCYTES # BLD: 0.6 K/UL (ref 0.05–1.2)
MONOCYTES NFR BLD: 12 % (ref 3–10)
NEUTS SEG # BLD: 2.5 K/UL (ref 1.8–8)
NEUTS SEG NFR BLD: 48 % (ref 40–73)
NRBC # BLD: 0 K/UL (ref 0–0.01)
NRBC BLD-RTO: 0 PER 100 WBC
PLATELET # BLD AUTO: 308 K/UL (ref 135–420)
PMV BLD AUTO: 9.9 FL (ref 9.2–11.8)
POTASSIUM SERPL-SCNC: 3.9 MMOL/L (ref 3.5–5.5)
PROT SERPL-MCNC: 7.6 G/DL (ref 6.4–8.2)
RBC # BLD AUTO: 4.42 M/UL (ref 4.2–5.3)
SODIUM SERPL-SCNC: 138 MMOL/L (ref 136–145)
T4 FREE SERPL-MCNC: 1.1 NG/DL (ref 0.7–1.5)
TRIGL SERPL-MCNC: 77 MG/DL
TSH SERPL DL<=0.05 MIU/L-ACNC: 2.64 UIU/ML (ref 0.36–3.74)
VLDLC SERPL CALC-MCNC: 15.4 MG/DL
WBC # BLD AUTO: 5.1 K/UL (ref 4.6–13.2)

## 2023-12-14 PROCEDURE — 80053 COMPREHEN METABOLIC PANEL: CPT

## 2023-12-14 PROCEDURE — 84439 ASSAY OF FREE THYROXINE: CPT

## 2023-12-14 PROCEDURE — 84443 ASSAY THYROID STIM HORMONE: CPT

## 2023-12-14 PROCEDURE — 80061 LIPID PANEL: CPT

## 2023-12-14 PROCEDURE — 36415 COLL VENOUS BLD VENIPUNCTURE: CPT

## 2023-12-14 PROCEDURE — 85025 COMPLETE CBC W/AUTO DIFF WBC: CPT

## 2024-02-27 ENCOUNTER — TRANSCRIBE ORDERS (OUTPATIENT)
Facility: HOSPITAL | Age: 54
End: 2024-02-27

## 2024-02-27 DIAGNOSIS — Z12.31 VISIT FOR SCREENING MAMMOGRAM: Primary | ICD-10-CM

## 2024-03-27 ENCOUNTER — HOSPITAL ENCOUNTER (OUTPATIENT)
Facility: HOSPITAL | Age: 54
Discharge: HOME | End: 2024-03-30
Payer: COMMERCIAL

## 2024-03-27 DIAGNOSIS — Z12.31 VISIT FOR SCREENING MAMMOGRAM: ICD-10-CM

## 2024-03-27 PROCEDURE — 77063 BREAST TOMOSYNTHESIS BI: CPT

## 2025-03-28 ENCOUNTER — HOSPITAL ENCOUNTER (OUTPATIENT)
Facility: HOSPITAL | Age: 55
Discharge: HOME | End: 2025-03-31
Payer: COMMERCIAL

## 2025-03-28 VITALS — HEIGHT: 68 IN | WEIGHT: 175 LBS | BODY MASS INDEX: 26.52 KG/M2

## 2025-03-28 DIAGNOSIS — Z12.31 VISIT FOR SCREENING MAMMOGRAM: ICD-10-CM

## 2025-03-28 PROCEDURE — 77063 BREAST TOMOSYNTHESIS BI: CPT
